# Patient Record
Sex: FEMALE | Race: WHITE | NOT HISPANIC OR LATINO | Employment: OTHER | URBAN - METROPOLITAN AREA
[De-identification: names, ages, dates, MRNs, and addresses within clinical notes are randomized per-mention and may not be internally consistent; named-entity substitution may affect disease eponyms.]

---

## 2022-08-16 ENCOUNTER — OFFICE VISIT (OUTPATIENT)
Dept: RHEUMATOLOGY | Facility: CLINIC | Age: 67
End: 2022-08-16
Payer: MEDICARE

## 2022-08-16 VITALS
OXYGEN SATURATION: 97 % | HEART RATE: 87 BPM | HEIGHT: 62 IN | DIASTOLIC BLOOD PRESSURE: 100 MMHG | SYSTOLIC BLOOD PRESSURE: 160 MMHG | BODY MASS INDEX: 29.59 KG/M2 | TEMPERATURE: 98 F | RESPIRATION RATE: 18 BRPM | WEIGHT: 160.81 LBS

## 2022-08-16 DIAGNOSIS — L40.9 PSORIASIS OF NAIL: ICD-10-CM

## 2022-08-16 DIAGNOSIS — M77.9 ENTHESOPATHY: ICD-10-CM

## 2022-08-16 DIAGNOSIS — M79.7 FIBROMYALGIA SYNDROME: ICD-10-CM

## 2022-08-16 DIAGNOSIS — L40.50 PSORIATIC ARTHRITIS: Primary | ICD-10-CM

## 2022-08-16 PROCEDURE — 99204 OFFICE O/P NEW MOD 45 MIN: CPT | Mod: S$GLB,,, | Performed by: INTERNAL MEDICINE

## 2022-08-16 PROCEDURE — 3008F PR BODY MASS INDEX (BMI) DOCUMENTED: ICD-10-PCS | Mod: CPTII,S$GLB,, | Performed by: INTERNAL MEDICINE

## 2022-08-16 PROCEDURE — 99999 PR PBB SHADOW E&M-NEW PATIENT-LVL V: CPT | Mod: PBBFAC,,, | Performed by: INTERNAL MEDICINE

## 2022-08-16 PROCEDURE — 4010F ACE/ARB THERAPY RXD/TAKEN: CPT | Mod: CPTII,S$GLB,, | Performed by: INTERNAL MEDICINE

## 2022-08-16 PROCEDURE — 3008F BODY MASS INDEX DOCD: CPT | Mod: CPTII,S$GLB,, | Performed by: INTERNAL MEDICINE

## 2022-08-16 PROCEDURE — 1159F MED LIST DOCD IN RCRD: CPT | Mod: CPTII,S$GLB,, | Performed by: INTERNAL MEDICINE

## 2022-08-16 PROCEDURE — 99999 PR PBB SHADOW E&M-NEW PATIENT-LVL V: ICD-10-PCS | Mod: PBBFAC,,, | Performed by: INTERNAL MEDICINE

## 2022-08-16 PROCEDURE — 4010F PR ACE/ARB THEARPY RXD/TAKEN: ICD-10-PCS | Mod: CPTII,S$GLB,, | Performed by: INTERNAL MEDICINE

## 2022-08-16 PROCEDURE — 1160F RVW MEDS BY RX/DR IN RCRD: CPT | Mod: CPTII,S$GLB,, | Performed by: INTERNAL MEDICINE

## 2022-08-16 PROCEDURE — 99204 PR OFFICE/OUTPT VISIT, NEW, LEVL IV, 45-59 MIN: ICD-10-PCS | Mod: S$GLB,,, | Performed by: INTERNAL MEDICINE

## 2022-08-16 PROCEDURE — 1159F PR MEDICATION LIST DOCUMENTED IN MEDICAL RECORD: ICD-10-PCS | Mod: CPTII,S$GLB,, | Performed by: INTERNAL MEDICINE

## 2022-08-16 PROCEDURE — 1160F PR REVIEW ALL MEDS BY PRESCRIBER/CLIN PHARMACIST DOCUMENTED: ICD-10-PCS | Mod: CPTII,S$GLB,, | Performed by: INTERNAL MEDICINE

## 2022-08-16 RX ORDER — FLUTICASONE PROPIONATE 50 MCG
2 SPRAY, SUSPENSION (ML) NASAL
COMMUNITY
Start: 2022-03-07

## 2022-08-16 RX ORDER — FEXOFENADINE HYDROCHLORIDE 180 MG/1
180 TABLET, FILM COATED ORAL DAILY
COMMUNITY
Start: 2022-03-07

## 2022-08-16 RX ORDER — LEFLUNOMIDE 20 MG/1
20 TABLET ORAL
Qty: 30 TABLET | Refills: 11 | Status: SHIPPED | OUTPATIENT
Start: 2022-08-16 | End: 2022-12-13 | Stop reason: SDUPTHER

## 2022-08-16 RX ORDER — PROMETHAZINE HYDROCHLORIDE 25 MG/1
25 TABLET ORAL EVERY 6 HOURS PRN
COMMUNITY
Start: 2022-06-15 | End: 2023-04-18

## 2022-08-16 RX ORDER — TIZANIDINE 2 MG/1
2 TABLET ORAL NIGHTLY
Qty: 30 TABLET | Refills: 5 | Status: SHIPPED | OUTPATIENT
Start: 2022-08-16 | End: 2022-12-13 | Stop reason: SDUPTHER

## 2022-08-16 RX ORDER — CLORAZEPATE DIPOTASSIUM 7.5 MG/1
7.5 TABLET ORAL 3 TIMES DAILY PRN
COMMUNITY
Start: 2022-08-08

## 2022-08-16 RX ORDER — BENAZEPRIL HYDROCHLORIDE 40 MG/1
40 TABLET ORAL 2 TIMES DAILY
COMMUNITY
Start: 2021-11-08

## 2022-08-16 RX ORDER — LOVASTATIN 40 MG/1
40 TABLET ORAL NIGHTLY PRN
COMMUNITY
Start: 2022-07-03

## 2022-08-16 RX ORDER — FAMOTIDINE 40 MG/1
40 TABLET, FILM COATED ORAL EVERY MORNING
COMMUNITY
Start: 2022-08-08

## 2022-08-16 NOTE — PROGRESS NOTES
"Subjective:       Patient ID: Cindy Goncalves is a 66 y.o. female.    Chief Complaint: New patient  (Self Referral..Arthritis..)    Pt  is complaining of joint pain involving her MCP PIP wrist elbow shoulders hips knees and ankles bilaterally.  Is 9/10 in intensity dull in quality and continuous.  It is associated with a morning stiffness lasting for more than 60 minutes. Pt reports having difficulty maintaining a good night of sleep and this has been associated with myalgia of 9/10 in intensity.  This pain is dull continuous and gets worse mainly at night.  It is associated with fatigue.   The patient has psoriasis on an off on her elbows posterior aspect and she has sausage toes bilaterally.    Review of Systems   Constitutional: Negative for appetite change, chills and fever.   HENT: Negative for congestion, ear pain, mouth sores, nosebleeds and trouble swallowing.    Eyes: Negative for photophobia and discharge.   Respiratory: Negative for chest tightness and shortness of breath.    Cardiovascular: Negative for chest pain.   Gastrointestinal: Negative for abdominal pain and vomiting.   Endocrine: Negative.    Genitourinary: Negative for hematuria.   Musculoskeletal:        As per HPI   Skin: Negative for rash.        The patient has psoriasis on an off on her elbows posterior aspect and she has sausage toes bilaterally.     Neurological: Negative for weakness.         Objective:   BP (!) 160/100   Pulse 87   Temp 98.1 °F (36.7 °C) (Oral)   Resp 18   Ht 5' 2" (1.575 m)   Wt 72.9 kg (160 lb 12.8 oz)   SpO2 97%   BMI 29.41 kg/m²      Physical Exam   Constitutional: She is oriented to person, place, and time. She appears well-developed and well-nourished. No distress.   HENT:   Head: Normocephalic and atraumatic.   Right Ear: External ear normal.   Left Ear: External ear normal.   Eyes: Pupils are equal, round, and reactive to light.   Cardiovascular: Normal rate, regular rhythm and normal heart sounds. "   Pulmonary/Chest: Breath sounds normal.   Abdominal: Soft. There is no abdominal tenderness.   Musculoskeletal:      Right shoulder: Tenderness present.      Left shoulder: Tenderness present.      Right elbow: Tenderness present.      Left elbow: Tenderness present.      Right wrist: Tenderness present.      Left wrist: Tenderness present.      Cervical back: Neck supple.      Right hip: Tenderness present.      Left hip: Tenderness present.      Right knee: Tenderness present.      Left knee: Tenderness present.      Right ankle: Tenderness present.      Left ankle: Tenderness present.   Lymphadenopathy:     She has no cervical adenopathy.   Neurological: She is alert and oriented to person, place, and time. She displays normal reflexes. No cranial nerve deficit or sensory deficit. She exhibits normal muscle tone. Coordination normal.   Skin: No rash noted. No erythema.   The patient has psoriasis on an off on her elbows posterior aspect and she has sausage toes bilaterally.     Vitals reviewed.      Right Side Rheumatological Exam     The patient is tender to palpation of the shoulder, elbow, wrist, knee, 1st PIP, 1st MCP, 2nd PIP, 2nd MCP, 3rd PIP, 3rd MCP, 4th PIP, 4th MCP, 5th PIP, hip, ankle, 1st MTP, 2nd MTP, 3rd MTP, 4th MTP, 5th MTP, 1st toe IP, 2nd toe IP, 3rd toe IP, 4th toe IP and 5th toe IP    Left Side Rheumatological Exam     The patient is tender to palpation of the shoulder, elbow, wrist, knee, 1st PIP, 1st MCP, 2nd PIP, 2nd MCP, 3rd PIP, 3rd MCP, 4th PIP, 4th MCP, 5th PIP, 5th MCP, hip, ankle, 1st MTP, 2nd MTP, 3rd MTP, 4th MTP, 5th MTP, 1st toe IP, 2nd toe IP, 3rd toe IP, 4th toe IP and 5th toe IP.         Completed Fibromyalgia exam 18/18 tender points.  No data to display   Sausage toes bilaterally.  Assessment:       1. Psoriatic arthritis    2. Psoriasis of nail    3. Fibromyalgia syndrome    4. Enthesopathy            Plan:       Problem List Items Addressed This Visit    None     Visit  Diagnoses     Psoriatic arthritis    -  Primary    Relevant Medications    leflunomide (ARAVA) 20 MG Tab    tiZANidine (ZANAFLEX) 2 MG tablet    folic acid-vit B6-vit B12 2.5-25-2 mg (FOLBIC OR EQUIV) 2.5-25-2 mg Tab    Psoriasis of nail        Relevant Medications    leflunomide (ARAVA) 20 MG Tab    tiZANidine (ZANAFLEX) 2 MG tablet    folic acid-vit B6-vit B12 2.5-25-2 mg (FOLBIC OR EQUIV) 2.5-25-2 mg Tab    Fibromyalgia syndrome        Relevant Medications    leflunomide (ARAVA) 20 MG Tab    tiZANidine (ZANAFLEX) 2 MG tablet    folic acid-vit B6-vit B12 2.5-25-2 mg (FOLBIC OR EQUIV) 2.5-25-2 mg Tab    Enthesopathy        Relevant Medications    leflunomide (ARAVA) 20 MG Tab    tiZANidine (ZANAFLEX) 2 MG tablet    folic acid-vit B6-vit B12 2.5-25-2 mg (FOLBIC OR EQUIV) 2.5-25-2 mg Tab

## 2022-12-13 ENCOUNTER — OFFICE VISIT (OUTPATIENT)
Dept: RHEUMATOLOGY | Facility: CLINIC | Age: 67
End: 2022-12-13
Payer: MEDICARE

## 2022-12-13 VITALS
BODY MASS INDEX: 28.59 KG/M2 | OXYGEN SATURATION: 96 % | WEIGHT: 155.38 LBS | RESPIRATION RATE: 18 BRPM | SYSTOLIC BLOOD PRESSURE: 173 MMHG | TEMPERATURE: 98 F | HEIGHT: 62 IN | DIASTOLIC BLOOD PRESSURE: 97 MMHG

## 2022-12-13 DIAGNOSIS — L40.9 PSORIASIS OF NAIL: ICD-10-CM

## 2022-12-13 DIAGNOSIS — M77.9 ENTHESOPATHY: ICD-10-CM

## 2022-12-13 DIAGNOSIS — L40.9 PSORIASIS: Primary | ICD-10-CM

## 2022-12-13 DIAGNOSIS — L40.50 PSORIATIC ARTHRITIS: ICD-10-CM

## 2022-12-13 DIAGNOSIS — M79.7 FIBROMYALGIA: ICD-10-CM

## 2022-12-13 PROCEDURE — 3080F DIAST BP >= 90 MM HG: CPT | Mod: CPTII,S$GLB,,

## 2022-12-13 PROCEDURE — 1101F PR PT FALLS ASSESS DOC 0-1 FALLS W/OUT INJ PAST YR: ICD-10-PCS | Mod: CPTII,S$GLB,,

## 2022-12-13 PROCEDURE — 1159F MED LIST DOCD IN RCRD: CPT | Mod: CPTII,S$GLB,,

## 2022-12-13 PROCEDURE — 3080F PR MOST RECENT DIASTOLIC BLOOD PRESSURE >= 90 MM HG: ICD-10-PCS | Mod: CPTII,S$GLB,,

## 2022-12-13 PROCEDURE — 4010F PR ACE/ARB THEARPY RXD/TAKEN: ICD-10-PCS | Mod: CPTII,S$GLB,,

## 2022-12-13 PROCEDURE — 3008F PR BODY MASS INDEX (BMI) DOCUMENTED: ICD-10-PCS | Mod: CPTII,S$GLB,,

## 2022-12-13 PROCEDURE — 99999 PR PBB SHADOW E&M-EST. PATIENT-LVL IV: ICD-10-PCS | Mod: PBBFAC,,,

## 2022-12-13 PROCEDURE — 99213 PR OFFICE/OUTPT VISIT, EST, LEVL III, 20-29 MIN: ICD-10-PCS | Mod: S$GLB,,,

## 2022-12-13 PROCEDURE — 1101F PT FALLS ASSESS-DOCD LE1/YR: CPT | Mod: CPTII,S$GLB,,

## 2022-12-13 PROCEDURE — 99999 PR PBB SHADOW E&M-EST. PATIENT-LVL IV: CPT | Mod: PBBFAC,,,

## 2022-12-13 PROCEDURE — 4010F ACE/ARB THERAPY RXD/TAKEN: CPT | Mod: CPTII,S$GLB,,

## 2022-12-13 PROCEDURE — 3077F SYST BP >= 140 MM HG: CPT | Mod: CPTII,S$GLB,,

## 2022-12-13 PROCEDURE — 3288F PR FALLS RISK ASSESSMENT DOCUMENTED: ICD-10-PCS | Mod: CPTII,S$GLB,,

## 2022-12-13 PROCEDURE — 1159F PR MEDICATION LIST DOCUMENTED IN MEDICAL RECORD: ICD-10-PCS | Mod: CPTII,S$GLB,,

## 2022-12-13 PROCEDURE — 99213 OFFICE O/P EST LOW 20 MIN: CPT | Mod: S$GLB,,,

## 2022-12-13 PROCEDURE — 3077F PR MOST RECENT SYSTOLIC BLOOD PRESSURE >= 140 MM HG: ICD-10-PCS | Mod: CPTII,S$GLB,,

## 2022-12-13 PROCEDURE — 3288F FALL RISK ASSESSMENT DOCD: CPT | Mod: CPTII,S$GLB,,

## 2022-12-13 PROCEDURE — 3008F BODY MASS INDEX DOCD: CPT | Mod: CPTII,S$GLB,,

## 2022-12-13 RX ORDER — TRIAMCINOLONE ACETONIDE 1 MG/G
OINTMENT TOPICAL 2 TIMES DAILY
Qty: 30 G | Refills: 2 | Status: SHIPPED | OUTPATIENT
Start: 2022-12-13 | End: 2023-04-18

## 2022-12-13 RX ORDER — LEFLUNOMIDE 20 MG/1
20 TABLET ORAL
Qty: 30 TABLET | Refills: 5 | Status: SHIPPED | OUTPATIENT
Start: 2022-12-13 | End: 2023-04-18 | Stop reason: SDUPTHER

## 2022-12-13 RX ORDER — TIZANIDINE 2 MG/1
2 TABLET ORAL NIGHTLY
Qty: 30 TABLET | Refills: 5 | Status: SHIPPED | OUTPATIENT
Start: 2022-12-13 | End: 2023-04-18 | Stop reason: SDUPTHER

## 2022-12-13 NOTE — PROGRESS NOTES
"Subjective:           Patient ID: Cindy Goncalves is a 67 y.o. female.    Chief Complaint: Follow-up      Ms. Goncalves is a 67-year-old here for a follow-up.  She initiated care with Dr. Padilla on 8/16/22 and was initiated on leflunomide. Patient does report history of dental cleaning and dental fillings resulting in infection. Decide appropriate for prophylactic antibiotics before next dental work. The patient has psoriasis on her elbows posterior aspect and she has sausage toes bilaterally. Reports having recent labwork. Will request labwork from Dr. Dorina [facility].  Today she reports significant improvement since starting the leflunomide. The patient reports her joint pain involving the MCP PIP wrist elbow shoulders hips knees and ankles bilaterally.  The pain is 1/10 in intensity dull in quality and continuous.  That is associated with a morning stiffness lasting for less than 5 minutes. They are associated with fatigue.  No fever no chills no others.        Review of Systems   Constitutional:  Negative for appetite change, chills and fever.   HENT:  Negative for congestion, ear pain, mouth sores, nosebleeds and trouble swallowing.    Eyes:  Negative for photophobia and discharge.   Respiratory:  Negative for chest tightness and shortness of breath.    Cardiovascular:  Negative for chest pain.   Gastrointestinal:  Negative for abdominal pain and vomiting.   Endocrine: Negative.    Genitourinary:  Negative for hematuria.   Musculoskeletal:         As per HPI   Skin:  Negative for rash.   Neurological:  Negative for weakness.       Objective:   BP (!) 173/97 (BP Location: Left arm, Patient Position: Sitting, BP Method: Medium (Automatic))   Temp 97.9 °F (36.6 °C) (Oral)   Resp 18   Ht 5' 2" (1.575 m)   Wt 70.5 kg (155 lb 6.4 oz)   SpO2 96%   BMI 28.42 kg/m²          Physical Exam   Constitutional: She is oriented to person, place, and time. She appears well-developed and " well-nourished. No distress.   HENT:   Head: Normocephalic and atraumatic.   Right Ear: External ear normal.   Left Ear: External ear normal.   Eyes: Pupils are equal, round, and reactive to light.   Cardiovascular: Normal rate, regular rhythm and normal heart sounds.   Pulmonary/Chest: Breath sounds normal.   Abdominal: Soft. There is no abdominal tenderness.   Musculoskeletal:      Cervical back: Neck supple.      Comments: Sausage toes   Lymphadenopathy:     She has no cervical adenopathy.   Neurological: She is alert and oriented to person, place, and time. She displays normal reflexes. No cranial nerve deficit or sensory deficit. She exhibits normal muscle tone. Coordination normal.   Skin: No rash noted. No erythema.   Vitals reviewed.     Completed Fibromyalgia exam 18/18 tender points.    No data to display     Assessment:         Medication List with Changes/Refills   New Medications    TRIAMCINOLONE ACETONIDE 0.1% (KENALOG) 0.1 % OINTMENT    Apply topically 2 (two) times daily. To affected areas       Start Date: 12/13/2022End Date: --   Current Medications    ALLERGY RELIEF, FEXOFENADINE, 180 MG TABLET    Take 180 mg by mouth once daily.       Start Date: 3/7/2022  End Date: --    BENAZEPRIL (LOTENSIN) 40 MG TABLET    Take 40 mg by mouth 2 (two) times a day.       Start Date: 11/8/2021 End Date: --    CLORAZEPATE (TRANXENE) 7.5 MG TAB    Take 7.5 mg by mouth 3 (three) times daily as needed.       Start Date: 8/8/2022  End Date: --    FAMOTIDINE (PEPCID) 40 MG TABLET    Take 40 mg by mouth every morning.       Start Date: 8/8/2022  End Date: --    FLUTICASONE PROPIONATE (FLONASE) 50 MCG/ACTUATION NASAL SPRAY    2 sprays by Each Nostril route as needed.       Start Date: 3/7/2022  End Date: --    LOVASTATIN (MEVACOR) 40 MG TABLET    Take 40 mg by mouth nightly as needed.       Start Date: 7/3/2022  End Date: --    PROMETHAZINE (PHENERGAN) 25 MG TABLET    Take 25 mg by mouth every 6 (six) hours as needed.        Start Date: 6/15/2022 End Date: --   Changed and/or Refilled Medications    Modified Medication Previous Medication    LEFLUNOMIDE (ARAVA) 20 MG TAB leflunomide (ARAVA) 20 MG Tab       Take 1 tablet (20 mg total) by mouth daily with dinner or evening meal.    Take 1 tablet (20 mg total) by mouth daily with dinner or evening meal.       Start Date: 12/13/2022End Date: --    Start Date: 8/16/2022 End Date: 12/13/2022    TIZANIDINE (ZANAFLEX) 2 MG TABLET tiZANidine (ZANAFLEX) 2 MG tablet       Take 1 tablet (2 mg total) by mouth nightly.    Take 1 tablet (2 mg total) by mouth nightly.       Start Date: 12/13/2022End Date: 6/11/2023    Start Date: 8/16/2022 End Date: 12/13/2022   Discontinued Medications    FOLIC ACID-VIT B6-VIT B12 2.5-25-2 MG (FOLBIC OR EQUIV) 2.5-25-2 MG TAB    Take 1 tablet by mouth once daily. After breakfast       Start Date: 8/16/2022 End Date: 12/13/2022         ICD-10-CM ICD-9-CM   1. Psoriasis  L40.9 696.1   2. Psoriatic arthritis  L40.50 696.0   3. Enthesopathy  M77.9 726.90   4. Fibromyalgia  M79.7 729.1   5. Psoriasis of nail  L40.9 696.1             Plan:       1. Psoriasis  Assessment & Plan:  Start Triamcinolone Cream    Orders:  -     triamcinolone acetonide 0.1% (KENALOG) 0.1 % ointment; Apply topically 2 (two) times daily. To affected areas  Dispense: 30 g; Refill: 2    2. Psoriatic arthritis  Assessment & Plan:  Continue leflunomide 20 mg daily    Orders:  -     leflunomide (ARAVA) 20 MG Tab; Take 1 tablet (20 mg total) by mouth daily with dinner or evening meal.  Dispense: 30 tablet; Refill: 5  -     tiZANidine (ZANAFLEX) 2 MG tablet; Take 1 tablet (2 mg total) by mouth nightly.  Dispense: 30 tablet; Refill: 5    3. Enthesopathy  -     leflunomide (ARAVA) 20 MG Tab; Take 1 tablet (20 mg total) by mouth daily with dinner or evening meal.  Dispense: 30 tablet; Refill: 5  -     tiZANidine (ZANAFLEX) 2 MG tablet; Take 1 tablet (2 mg total) by mouth nightly.  Dispense: 30 tablet;  Refill: 5    4. Fibromyalgia  Assessment & Plan:  Continue tizanidine 2 mg daily at bedtime  Continue Folbic daily      5. Psoriasis of nail

## 2023-04-17 NOTE — ASSESSMENT & PLAN NOTE
Continue leflunomide 20 mg daily    [facility]

## 2023-04-17 NOTE — PROGRESS NOTES
Subjective:           Patient ID: Cindy Goncalves is a 67 y.o. female.    Chief Complaint: Follow-up      Ms. Goncalves is a 67-year-old here for a follow-up.  She initiated care with Dr. Padilla on 8/16/22 and was initiated on leflunomide. Patient does report history of dental cleaning and dental fillings resulting in infection. Discussed appropriate for prophylactic antibiotics before next dental work. She's had 2 past infections with dental cleaning and requests her dentis start the prophylactic antibiotics before the cleaning. The patient has psoriasis on her elbows posterior aspect and she has sausage toes bilaterally. She sees her PCP next month and is having lab work this month and patient will request them to send us those lab results from Dr. Dorina Rosas Internal [facility].  She does have a skin spot that is dark with atypical borders and also hx of atypical mole- requests referral to dermatology today.Today she reports significant improvement since starting the leflunomide. The patient reports joint pain involving the MCP PIP wrist elbow shoulders hips knees and ankles bilaterally.  The pain is 1/10 in intensity dull in quality and continuous.  That is associated with a morning stiffness lasting for less than 5 minutes. Reports her quality of life has significantly improved and she is now able to do activities she once enjoyed such as gardening.  Denies fever and chills.        Review of Systems   Constitutional:  Negative for appetite change, chills and fever.   HENT:  Negative for congestion, ear pain, mouth sores, nosebleeds and trouble swallowing.    Eyes:  Negative for photophobia and discharge.   Respiratory:  Negative for chest tightness and shortness of breath.    Cardiovascular:  Negative for chest pain.   Gastrointestinal:  Negative for abdominal pain and vomiting.   Endocrine: Negative.    Genitourinary:  Negative for hematuria.   Musculoskeletal:         As per HPI   Skin:  Negative for  "rash.        psoiasis   Neurological:  Negative for weakness.       Objective:   BP (!) 160/90 (BP Location: Left arm, Patient Position: Sitting, BP Method: Medium (Manual))   Pulse 90   Temp 98.1 °F (36.7 °C) (Oral)   Resp 18   Ht 5' 2" (1.575 m)   Wt 70.8 kg (156 lb)   SpO2 (!) 94%   BMI 28.53 kg/m²          Physical Exam   Constitutional: She is oriented to person, place, and time. She appears well-developed and well-nourished. No distress.   HENT:   Head: Normocephalic and atraumatic.   Right Ear: External ear normal.   Left Ear: External ear normal.   Eyes: Pupils are equal, round, and reactive to light.   Cardiovascular: Normal rate.   Pulmonary/Chest: Effort normal.   Abdominal: Soft. There is no abdominal tenderness.   Musculoskeletal:      Cervical back: Neck supple.      Comments: Sausage toes   Lymphadenopathy:     She has no cervical adenopathy.   Neurological: She is alert and oriented to person, place, and time. She displays normal reflexes. No cranial nerve deficit or sensory deficit. She exhibits normal muscle tone. Coordination normal.   Skin: No rash noted. No erythema.   Vitals reviewed.     Completed Fibromyalgia exam 18/18 tender points.    No data to display     Assessment:         Medication List with Changes/Refills   New Medications    PREDNISONE (DELTASONE) 5 MG TABLET    Take 1 tablet (5 mg total) by mouth daily as needed (for flare up). AFTER BREAKFAST       Start Date: 4/18/2023 End Date: --   Current Medications    ALLERGY RELIEF, FEXOFENADINE, 180 MG TABLET    Take 180 mg by mouth once daily.       Start Date: 3/7/2022  End Date: --    BENAZEPRIL (LOTENSIN) 40 MG TABLET    Take 40 mg by mouth 2 (two) times a day.       Start Date: 11/8/2021 End Date: --    CLORAZEPATE (TRANXENE) 7.5 MG TAB    Take 7.5 mg by mouth 3 (three) times daily as needed.       Start Date: 8/8/2022  End Date: --    FAMOTIDINE (PEPCID) 40 MG TABLET    Take 40 mg by mouth every morning.       Start Date: " 8/8/2022  End Date: --    FLUTICASONE PROPIONATE (FLONASE) 50 MCG/ACTUATION NASAL SPRAY    2 sprays by Each Nostril route as needed.       Start Date: 3/7/2022  End Date: --    LOVASTATIN (MEVACOR) 40 MG TABLET    Take 40 mg by mouth nightly as needed.       Start Date: 7/3/2022  End Date: --   Changed and/or Refilled Medications    Modified Medication Previous Medication    LEFLUNOMIDE (ARAVA) 20 MG TAB leflunomide (ARAVA) 20 MG Tab       Take 1 tablet (20 mg total) by mouth daily with dinner or evening meal.    Take 1 tablet (20 mg total) by mouth daily with dinner or evening meal.       Start Date: 4/18/2023 End Date: --    Start Date: 12/13/2022End Date: 4/18/2023    TIZANIDINE (ZANAFLEX) 2 MG TABLET tiZANidine (ZANAFLEX) 2 MG tablet       Take 1 tablet (2 mg total) by mouth nightly.    Take 1 tablet (2 mg total) by mouth nightly.       Start Date: 4/18/2023 End Date: 10/15/2023    Start Date: 12/13/2022End Date: 4/18/2023   Discontinued Medications    PROMETHAZINE (PHENERGAN) 25 MG TABLET    Take 25 mg by mouth every 6 (six) hours as needed.       Start Date: 6/15/2022 End Date: 4/18/2023    TRIAMCINOLONE ACETONIDE 0.1% (KENALOG) 0.1 % OINTMENT    Apply topically 2 (two) times daily. To affected areas       Start Date: 12/13/2022End Date: 4/18/2023         ICD-10-CM ICD-9-CM   1. Psoriatic arthritis  L40.50 696.0   2. Psoriasis  L40.9 696.1   3. Fibromyalgia  M79.7 729.1   4. Enthesopathy  M77.9 726.90   5. History of atypical skin mole  Z86.018 V13.3             Plan:       1. Psoriatic arthritis  Assessment & Plan:  Continue leflunomide 20 mg daily    Need to request completed  labwork from Dr. Dorina [facility].     Orders:  -     leflunomide (ARAVA) 20 MG Tab; Take 1 tablet (20 mg total) by mouth daily with dinner or evening meal.  Dispense: 30 tablet; Refill: 5  -     tiZANidine (ZANAFLEX) 2 MG tablet; Take 1 tablet (2 mg total) by mouth nightly.  Dispense: 30 tablet; Refill:  5    2. Psoriasis  Assessment & Plan:  Currently stable    Orders:  -     Ambulatory referral/consult to Dermatology; Future; Expected date: 04/25/2023    3. Fibromyalgia  Assessment & Plan:  Continue tizanidine 2 mg daily at bedtime  Continue Folbic daily      4. Enthesopathy  -     leflunomide (ARAVA) 20 MG Tab; Take 1 tablet (20 mg total) by mouth daily with dinner or evening meal.  Dispense: 30 tablet; Refill: 5    5. History of atypical skin mole  Assessment & Plan:  Referral to Dermatology- Dr. Dwain Vazquez    Orders:  -     Ambulatory referral/consult to Dermatology; Future; Expected date: 04/25/2023    Other orders  -     predniSONE (DELTASONE) 5 MG tablet; Take 1 tablet (5 mg total) by mouth daily as needed (for flare up). AFTER BREAKFAST  Dispense: 30 tablet; Refill: 0

## 2023-04-18 ENCOUNTER — OFFICE VISIT (OUTPATIENT)
Dept: RHEUMATOLOGY | Facility: CLINIC | Age: 68
End: 2023-04-18
Payer: MEDICARE

## 2023-04-18 VITALS
WEIGHT: 156 LBS | RESPIRATION RATE: 18 BRPM | DIASTOLIC BLOOD PRESSURE: 90 MMHG | SYSTOLIC BLOOD PRESSURE: 160 MMHG | TEMPERATURE: 98 F | OXYGEN SATURATION: 94 % | BODY MASS INDEX: 28.71 KG/M2 | HEART RATE: 90 BPM | HEIGHT: 62 IN

## 2023-04-18 DIAGNOSIS — L40.50 PSORIATIC ARTHRITIS: Primary | ICD-10-CM

## 2023-04-18 DIAGNOSIS — L40.9 PSORIASIS: ICD-10-CM

## 2023-04-18 DIAGNOSIS — M77.9 ENTHESOPATHY: ICD-10-CM

## 2023-04-18 DIAGNOSIS — M79.7 FIBROMYALGIA: ICD-10-CM

## 2023-04-18 DIAGNOSIS — Z86.018 HISTORY OF ATYPICAL SKIN MOLE: ICD-10-CM

## 2023-04-18 PROCEDURE — 3288F FALL RISK ASSESSMENT DOCD: CPT | Mod: CPTII,S$GLB,,

## 2023-04-18 PROCEDURE — 1101F PR PT FALLS ASSESS DOC 0-1 FALLS W/OUT INJ PAST YR: ICD-10-PCS | Mod: CPTII,S$GLB,,

## 2023-04-18 PROCEDURE — 3080F PR MOST RECENT DIASTOLIC BLOOD PRESSURE >= 90 MM HG: ICD-10-PCS | Mod: CPTII,S$GLB,,

## 2023-04-18 PROCEDURE — 3077F PR MOST RECENT SYSTOLIC BLOOD PRESSURE >= 140 MM HG: ICD-10-PCS | Mod: CPTII,S$GLB,,

## 2023-04-18 PROCEDURE — 3080F DIAST BP >= 90 MM HG: CPT | Mod: CPTII,S$GLB,,

## 2023-04-18 PROCEDURE — 1159F MED LIST DOCD IN RCRD: CPT | Mod: CPTII,S$GLB,,

## 2023-04-18 PROCEDURE — 99214 PR OFFICE/OUTPT VISIT, EST, LEVL IV, 30-39 MIN: ICD-10-PCS | Mod: S$GLB,,,

## 2023-04-18 PROCEDURE — 4010F ACE/ARB THERAPY RXD/TAKEN: CPT | Mod: CPTII,S$GLB,,

## 2023-04-18 PROCEDURE — 3008F BODY MASS INDEX DOCD: CPT | Mod: CPTII,S$GLB,,

## 2023-04-18 PROCEDURE — 99999 PR PBB SHADOW E&M-EST. PATIENT-LVL IV: ICD-10-PCS | Mod: PBBFAC,,,

## 2023-04-18 PROCEDURE — 99214 OFFICE O/P EST MOD 30 MIN: CPT | Mod: S$GLB,,,

## 2023-04-18 PROCEDURE — 1101F PT FALLS ASSESS-DOCD LE1/YR: CPT | Mod: CPTII,S$GLB,,

## 2023-04-18 PROCEDURE — 1126F AMNT PAIN NOTED NONE PRSNT: CPT | Mod: CPTII,S$GLB,,

## 2023-04-18 PROCEDURE — 1126F PR PAIN SEVERITY QUANTIFIED, NO PAIN PRESENT: ICD-10-PCS | Mod: CPTII,S$GLB,,

## 2023-04-18 PROCEDURE — 99999 PR PBB SHADOW E&M-EST. PATIENT-LVL IV: CPT | Mod: PBBFAC,,,

## 2023-04-18 PROCEDURE — 3077F SYST BP >= 140 MM HG: CPT | Mod: CPTII,S$GLB,,

## 2023-04-18 PROCEDURE — 3288F PR FALLS RISK ASSESSMENT DOCUMENTED: ICD-10-PCS | Mod: CPTII,S$GLB,,

## 2023-04-18 PROCEDURE — 4010F PR ACE/ARB THEARPY RXD/TAKEN: ICD-10-PCS | Mod: CPTII,S$GLB,,

## 2023-04-18 PROCEDURE — 3008F PR BODY MASS INDEX (BMI) DOCUMENTED: ICD-10-PCS | Mod: CPTII,S$GLB,,

## 2023-04-18 PROCEDURE — 1159F PR MEDICATION LIST DOCUMENTED IN MEDICAL RECORD: ICD-10-PCS | Mod: CPTII,S$GLB,,

## 2023-04-18 RX ORDER — PREDNISONE 5 MG/1
5 TABLET ORAL DAILY PRN
Qty: 30 TABLET | Refills: 0 | Status: SHIPPED | OUTPATIENT
Start: 2023-04-18 | End: 2024-01-09

## 2023-04-18 RX ORDER — LEFLUNOMIDE 20 MG/1
20 TABLET ORAL
Qty: 30 TABLET | Refills: 5 | Status: SHIPPED | OUTPATIENT
Start: 2023-04-18 | End: 2023-08-07 | Stop reason: SDUPTHER

## 2023-04-18 RX ORDER — TIZANIDINE 2 MG/1
2 TABLET ORAL NIGHTLY
Qty: 30 TABLET | Refills: 5 | Status: SHIPPED | OUTPATIENT
Start: 2023-04-18 | End: 2023-08-07 | Stop reason: SDUPTHER

## 2023-06-22 ENCOUNTER — TELEPHONE (OUTPATIENT)
Dept: RHEUMATOLOGY | Facility: CLINIC | Age: 68
End: 2023-06-22
Payer: MEDICARE

## 2023-06-22 NOTE — TELEPHONE ENCOUNTER
Pt is having a routine dental cleaning tomorrow and her dentist was going to send her in some antibiotics but, she wanted us to send it. Advised pt that her dentist could send it in for her and that would be okay. Pt verbally understands and will call her dentist.

## 2023-06-22 NOTE — TELEPHONE ENCOUNTER
----- Message from María Peters sent at 6/22/2023  9:17 AM CDT -----  Regarding: Nurse Call Back  Patient called stating she needs to have dental work Friday and would like to speak to someone in reference to medications before procedure. [phone]

## 2023-08-07 ENCOUNTER — OFFICE VISIT (OUTPATIENT)
Dept: RHEUMATOLOGY | Facility: CLINIC | Age: 68
End: 2023-08-07
Payer: MEDICARE

## 2023-08-07 ENCOUNTER — TELEPHONE (OUTPATIENT)
Dept: RHEUMATOLOGY | Facility: CLINIC | Age: 68
End: 2023-08-07

## 2023-08-07 VITALS
SYSTOLIC BLOOD PRESSURE: 165 MMHG | HEIGHT: 62 IN | HEART RATE: 80 BPM | OXYGEN SATURATION: 98 % | DIASTOLIC BLOOD PRESSURE: 90 MMHG | TEMPERATURE: 98 F | RESPIRATION RATE: 18 BRPM | BODY MASS INDEX: 29.01 KG/M2 | WEIGHT: 157.63 LBS

## 2023-08-07 DIAGNOSIS — Z86.018 HISTORY OF ATYPICAL SKIN MOLE: ICD-10-CM

## 2023-08-07 DIAGNOSIS — L40.9 PSORIASIS: ICD-10-CM

## 2023-08-07 DIAGNOSIS — L40.50 PSORIATIC ARTHRITIS: Primary | ICD-10-CM

## 2023-08-07 DIAGNOSIS — M77.9 ENTHESOPATHY: ICD-10-CM

## 2023-08-07 DIAGNOSIS — M79.7 FIBROMYALGIA: ICD-10-CM

## 2023-08-07 PROCEDURE — 3080F DIAST BP >= 90 MM HG: CPT | Mod: CPTII,S$GLB,,

## 2023-08-07 PROCEDURE — 3080F PR MOST RECENT DIASTOLIC BLOOD PRESSURE >= 90 MM HG: ICD-10-PCS | Mod: CPTII,S$GLB,,

## 2023-08-07 PROCEDURE — 3077F SYST BP >= 140 MM HG: CPT | Mod: CPTII,S$GLB,,

## 2023-08-07 PROCEDURE — 3077F PR MOST RECENT SYSTOLIC BLOOD PRESSURE >= 140 MM HG: ICD-10-PCS | Mod: CPTII,S$GLB,,

## 2023-08-07 PROCEDURE — 3008F BODY MASS INDEX DOCD: CPT | Mod: CPTII,S$GLB,,

## 2023-08-07 PROCEDURE — 1126F AMNT PAIN NOTED NONE PRSNT: CPT | Mod: CPTII,S$GLB,,

## 2023-08-07 PROCEDURE — 1159F MED LIST DOCD IN RCRD: CPT | Mod: CPTII,S$GLB,,

## 2023-08-07 PROCEDURE — 99999 PR PBB SHADOW E&M-EST. PATIENT-LVL V: CPT | Mod: PBBFAC,,,

## 2023-08-07 PROCEDURE — 1159F PR MEDICATION LIST DOCUMENTED IN MEDICAL RECORD: ICD-10-PCS | Mod: CPTII,S$GLB,,

## 2023-08-07 PROCEDURE — 3008F PR BODY MASS INDEX (BMI) DOCUMENTED: ICD-10-PCS | Mod: CPTII,S$GLB,,

## 2023-08-07 PROCEDURE — 4010F ACE/ARB THERAPY RXD/TAKEN: CPT | Mod: CPTII,S$GLB,,

## 2023-08-07 PROCEDURE — 1126F PR PAIN SEVERITY QUANTIFIED, NO PAIN PRESENT: ICD-10-PCS | Mod: CPTII,S$GLB,,

## 2023-08-07 PROCEDURE — 99214 PR OFFICE/OUTPT VISIT, EST, LEVL IV, 30-39 MIN: ICD-10-PCS | Mod: S$GLB,,,

## 2023-08-07 PROCEDURE — 1101F PR PT FALLS ASSESS DOC 0-1 FALLS W/OUT INJ PAST YR: ICD-10-PCS | Mod: CPTII,S$GLB,,

## 2023-08-07 PROCEDURE — 4010F PR ACE/ARB THEARPY RXD/TAKEN: ICD-10-PCS | Mod: CPTII,S$GLB,,

## 2023-08-07 PROCEDURE — 3288F PR FALLS RISK ASSESSMENT DOCUMENTED: ICD-10-PCS | Mod: CPTII,S$GLB,,

## 2023-08-07 PROCEDURE — 3288F FALL RISK ASSESSMENT DOCD: CPT | Mod: CPTII,S$GLB,,

## 2023-08-07 PROCEDURE — 99999 PR PBB SHADOW E&M-EST. PATIENT-LVL V: ICD-10-PCS | Mod: PBBFAC,,,

## 2023-08-07 PROCEDURE — 99214 OFFICE O/P EST MOD 30 MIN: CPT | Mod: S$GLB,,,

## 2023-08-07 PROCEDURE — 1101F PT FALLS ASSESS-DOCD LE1/YR: CPT | Mod: CPTII,S$GLB,,

## 2023-08-07 RX ORDER — TIZANIDINE 2 MG/1
2 TABLET ORAL NIGHTLY
Qty: 30 TABLET | Refills: 5 | Status: SHIPPED | OUTPATIENT
Start: 2023-08-07 | End: 2024-02-03

## 2023-08-07 RX ORDER — LEFLUNOMIDE 20 MG/1
20 TABLET ORAL
Qty: 30 TABLET | Refills: 5 | Status: SHIPPED | OUTPATIENT
Start: 2023-08-07 | End: 2024-01-09 | Stop reason: SDUPTHER

## 2023-08-07 NOTE — PROGRESS NOTES
"Subjective:           Patient ID: Cindy Goncalves is a 67 y.o. female.    Chief Complaint: Follow-up (Issues with skin . Hands cracking in different areas. Fingers locking on left hand . )      Ms. Goncalves is a 67-year-old here for a follow-up.  She initiated care with Dr. Padilla on 8/16/22 and was initiated on leflunomide. Patient does report history of dental cleaning and dental fillings resulting in infection. Discussed appropriate for prophylactic antibiotics before next dental work. She's had 2 past infections with dental cleaning and requests her dentis start the prophylactic antibiotics before the cleaning. The patient has psoriasis on her elbows posterior aspect in the past but not currently  and she has sausage toes bilaterally.     At her last office visit in March 2023 she was having lab work with her PCP Dr. Dorina Rosas Internal medicine and was going to have them send us those labs. As requested she was also referred to Dermatology for  skin spot that is dark with atypical borders and also hx of atypical mole. She reports her insurance did not cover the referral to Dr. Vazquez's office and requesting referral to [facility] per insurance.  There were no medication changes at her last office visit    Today she reports she is doing well and this is the "best she's felt in years" She is able to garden and be active with little pain. The patient reports joint pain involving the MCP PIP wrist elbow shoulders hips knees and ankles bilaterally.  The pain is 1/10 in intensity dull in quality and continuous.  That is associated with a morning stiffness lasting for less than 5 minutes. Reports her quality of life has significantly improved and she is now able to do activities she once enjoyed such as gardening.  Denies fever and chills.        Review of Systems   Constitutional:  Negative for appetite change, chills and fever.   HENT:  Negative for congestion, ear pain, mouth sores, nosebleeds and " "trouble swallowing.    Eyes:  Negative for photophobia and discharge.   Respiratory:  Negative for chest tightness and shortness of breath.    Cardiovascular:  Negative for chest pain.   Gastrointestinal:  Negative for abdominal pain and vomiting.   Endocrine: Negative.    Genitourinary:  Negative for hematuria.   Musculoskeletal:         As per HPI   Skin:  Negative for rash.        Psoriasis   Cracking on right hand between first and second digits   Neurological:  Negative for weakness.         Objective:   BP (!) 165/90 (BP Location: Right arm, Patient Position: Sitting, BP Method: Medium (Manual))   Pulse 80   Temp 98.1 °F (36.7 °C) (Oral)   Resp 18   Ht 5' 2" (1.575 m)   Wt 71.5 kg (157 lb 9.6 oz)   SpO2 98%   BMI 28.83 kg/m²          Physical Exam   Constitutional: She is oriented to person, place, and time. She appears well-developed and well-nourished. No distress.   HENT:   Head: Normocephalic and atraumatic.   Right Ear: External ear normal.   Left Ear: External ear normal.   Eyes: Pupils are equal, round, and reactive to light.   Cardiovascular: Normal rate.   Pulmonary/Chest: Effort normal.   Abdominal: Soft. There is no abdominal tenderness.   Musculoskeletal:      Cervical back: Neck supple.      Comments: Sausage toes   Lymphadenopathy:     She has no cervical adenopathy.   Neurological: She is alert and oriented to person, place, and time. She displays normal reflexes. No cranial nerve deficit or sensory deficit. She exhibits normal muscle tone. Coordination normal.   Skin: No rash noted. No erythema.   Vitals reviewed.       Completed Fibromyalgia exam 18/18 tender points.    No data to display     Assessment:         Medication List with Changes/Refills   Current Medications    ALLERGY RELIEF, FEXOFENADINE, 180 MG TABLET    Take 180 mg by mouth once daily.       Start Date: 3/7/2022  End Date: --    BENAZEPRIL (LOTENSIN) 40 MG TABLET    Take 40 mg by mouth 2 (two) times a day.       Start " Date: 11/8/2021 End Date: --    CLORAZEPATE (TRANXENE) 7.5 MG TAB    Take 7.5 mg by mouth 3 (three) times daily as needed.       Start Date: 8/8/2022  End Date: --    FAMOTIDINE (PEPCID) 40 MG TABLET    Take 40 mg by mouth every morning.       Start Date: 8/8/2022  End Date: --    FLUTICASONE PROPIONATE (FLONASE) 50 MCG/ACTUATION NASAL SPRAY    2 sprays by Each Nostril route as needed.       Start Date: 3/7/2022  End Date: --    LOVASTATIN (MEVACOR) 40 MG TABLET    Take 40 mg by mouth nightly as needed.       Start Date: 7/3/2022  End Date: --    PREDNISONE (DELTASONE) 5 MG TABLET    Take 1 tablet (5 mg total) by mouth daily as needed (for flare up). AFTER BREAKFAST       Start Date: 4/18/2023 End Date: --   Changed and/or Refilled Medications    Modified Medication Previous Medication    LEFLUNOMIDE (ARAVA) 20 MG TAB leflunomide (ARAVA) 20 MG Tab       Take 1 tablet (20 mg total) by mouth daily with dinner or evening meal.    Take 1 tablet (20 mg total) by mouth daily with dinner or evening meal.       Start Date: 8/7/2023  End Date: --    Start Date: 4/18/2023 End Date: 8/7/2023    TIZANIDINE (ZANAFLEX) 2 MG TABLET tiZANidine (ZANAFLEX) 2 MG tablet       Take 1 tablet (2 mg total) by mouth nightly.    Take 1 tablet (2 mg total) by mouth nightly.       Start Date: 8/7/2023  End Date: 2/3/2024    Start Date: 4/18/2023 End Date: 8/7/2023         ICD-10-CM ICD-9-CM   1. Psoriatic arthritis  L40.50 696.0   2. Fibromyalgia  M79.7 729.1   3. Psoriasis  L40.9 696.1   4. History of atypical skin mole  Z86.018 V13.3   5. Enthesopathy  M77.9 726.90             Plan:       1. Psoriatic arthritis  Assessment & Plan:  Continue leflunomide 20 mg daily  Continue prednisone 5 mg daily PRN for flare up. She has not needed to use this .     [facility]     Orders:  -     tiZANidine (ZANAFLEX) 2 MG tablet; Take 1 tablet (2 mg total) by mouth nightly.   Dispense: 30 tablet; Refill: 5  -     leflunomide (ARAVA) 20 MG Tab; Take 1 tablet (20 mg total) by mouth daily with dinner or evening meal.  Dispense: 30 tablet; Refill: 5  -     Ambulatory referral/consult to Dermatology; Future; Expected date: 08/14/2023    2. Fibromyalgia  Assessment & Plan:  Continue tizanidine 2 mg daily at bedtime        3. Psoriasis  Assessment & Plan:  Currently stable  Referred to Dermatology, Dr Dwain Vazquez at last office visit but was not covered by her insurance. She is requesting  Referral to [facility], which is covered by her insurance.    Currently using triamcinolone with improvement    Past psoriasis on  bilateral elbows and knees    Orders:  -     Ambulatory referral/consult to Dermatology; Future; Expected date: 08/14/2023    4. History of atypical skin mole  Assessment & Plan:  Referral to Dermatology- [facility]    Orders:  -     Ambulatory referral/consult to Dermatology; Future; Expected date: 08/14/2023    5. Enthesopathy  -     leflunomide (ARAVA) 20 MG Tab; Take 1 tablet (20 mg total) by mouth daily with dinner or evening meal.  Dispense: 30 tablet; Refill: 5             Refer to Dermatology  31 minutes of total time spent on the encounter, which includes face to face time and non-face to face time preparing to see the patient (eg, review of tests), Obtaining and/or reviewing separately obtained history, Documenting clinical information in the electronic or other health record, Independently interpreting results (not separately reported) and communicating results to the patient/family/caregiver, or Care coordination (not separately reported).

## 2023-08-07 NOTE — ASSESSMENT & PLAN NOTE
Currently stable  Referred to Dermatology, Dr Dwain Vazquez at last office visit but was not covered by her insurance. She is requesting  Referral to [facility], which is covered by her insurance.    Currently using triamcinolone with improvement    Past psoriasis on  bilateral elbows and knees

## 2023-08-07 NOTE — TELEPHONE ENCOUNTER
Please request the last completed lab work from her PCP, Dr. Dorina Tineo with [facility]. (Completed about 3-4 months ago)  Phone: [phone]  Fax: [phone]

## 2023-08-07 NOTE — ASSESSMENT & PLAN NOTE
Continue leflunomide 20 mg daily  Continue prednisone 5 mg daily PRN for flare up. She has not needed to use this .     Need to request completed  labwork from Dr. Dorina Perry Internal [facility].

## 2023-08-17 NOTE — TELEPHONE ENCOUNTER
Please let her know that we received the labs from PCP and I reviewed them. Her CBC and kidney function and liver enzymes are all normal. If vitamin D level is low. Her level is 18 and normal is .  Did her PCP give her an rx for weekly vitamin D? If not, I would recommend a weekly Vitamin D supplement that I could send to her pharmacy  She can continue all of her medications that we prescribe.

## 2023-08-17 NOTE — TELEPHONE ENCOUNTER
Spoke with patient verbalized understanding. She was prescribed vitamin D by her pcp which was too strong and she couldn't tolerate it . She is wanting to take a otc vitamin D. She did state that she is having neck pain . She did speak with her pcp and was prescribed antibiotics thinking it could be her sinuses or some other illness that could be treated with antibiotics. She did state that the neck pain is getting worse and she doesn't know what it could be from . Please Advise. Thanks

## 2023-08-18 NOTE — TELEPHONE ENCOUNTER
Called patient left detailed voicemail advising of your message and advised the patient to call the office if she has any further questions or concerns.

## 2023-08-18 NOTE — TELEPHONE ENCOUNTER
Noted; OTC vitamin D is fine. As for the neck pain, I am not sure what it is from either. If it is muscular in nature, than the Tizanidine can help it. Also Magnesium oxide 400 mg OTC vitamin also helps relax the muscle which can help.

## 2024-01-08 PROBLEM — E55.9 VITAMIN D DEFICIENCY: Status: ACTIVE | Noted: 2024-01-08

## 2024-01-08 NOTE — PROGRESS NOTES
"Subjective:           Patient ID: Cindy Goncalves is a 68 y.o. female.    Chief Complaint: Follow-up (Follow up/ spoke about gastro issues . Medication questions before upcoming surgery.)      Ms. Goncalves is a 68-year-old here for a follow-up.  She initiated care with Dr. Padilla on 8/16/22 and was initiated on leflunomide. The patient has psoriasis on her elbows posterior aspect in the past but not currently  and she has sausage toes bilaterally. At her last visit she reported she is doing well and this is the "best she's felt in years" She is able to garden and be active with little pain. At prior visits she was referred to Dr. Leal office and more recently referred to [facility] for skin spot that was dark with atypical borders and she does have hx of atypical mole.    Today January 9, 2024: Recently hospitalized with small bowel construction. 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 Most recent hospitalization was last week. She does have appointments for more testing with GI surgeon and gastro to evaluate the need for a possibility of future intestinal surgery. She also has fei appointment in the near future with a cardiologist for an abnormal EKG and cardiac clearance. Since her last visit, she was evaluated by [facility] for atypical mole and was told it was a sun spot. Prescribed ointment and leave in liquid for scalp for psoriasis. She does endorse antibiotics for enlarged lymph node which has now resolved. She was also prescribed antibiotics for upper respiratory infection that she finished 2 weeks ago. Reports her joint pain/stiffness has remained minimal and the Leflunomide continues to work well for her. Her blood pressure is elevated today but reports her numbers are actually much better and she is following closely by PCP    Followed by Dr. Spencer GI surgeon for small bowel obstruction  Followed by [facility]- [facility]  Followed " "by Cardiology- new appointment next for abnormal EKG.    Hx of cancer:  5 years ago- hysterectomy- then came back and completed radiation 3 years ago    Hx of infection resulting from dental cleaning and dental fillings- dentist uses pyophylactic antibiotics before cleaning    Denies history of fevers, rashes, photosensitivity, oral or nasal ulcers, h/o MI, stroke, seizures, h/o PE or DVT, , uveitis.      Family history of autoimmune disease: None  Pregnancies: 1 Miscarriages: 0  Smoking: Previous Smoker Quit in 2005               Review of Systems   Constitutional:  Negative for appetite change, chills and fever.   HENT:  Negative for congestion, ear pain, mouth sores, nosebleeds and trouble swallowing.    Eyes:  Negative for photophobia and discharge.   Respiratory:  Negative for chest tightness and shortness of breath.    Cardiovascular:  Negative for chest pain.   Gastrointestinal:  Negative for abdominal pain and vomiting.   Endocrine: Negative.    Genitourinary:  Negative for hematuria.   Musculoskeletal:         As per HPI   Skin:  Negative for rash.        Psoriasis   Cracking on right hand between first and second digits   Neurological:  Negative for weakness.         Objective:   BP (!) 160/70 (BP Location: Left arm, Patient Position: Sitting, BP Method: Medium (Manual))   Pulse 98   Temp 98.3 °F (36.8 °C) (Oral)   Resp 18   Ht 5' 2" (1.575 m)   Wt 67.3 kg (148 lb 6.4 oz)   SpO2 97%   BMI 27.14 kg/m²          Physical Exam   Constitutional: She is oriented to person, place, and time. She appears well-developed and well-nourished. No distress.   HENT:   Head: Normocephalic and atraumatic.   Right Ear: External ear normal.   Left Ear: External ear normal.   Eyes: Pupils are equal, round, and reactive to light.   Cardiovascular: Normal rate.   Pulmonary/Chest: Effort normal.   Abdominal: Soft. There is no abdominal tenderness.   Musculoskeletal:      Cervical back: Neck supple.      Comments: Sausage " toes   Neurological: She is alert and oriented to person, place, and time. She displays normal reflexes. No cranial nerve deficit or sensory deficit. She exhibits normal muscle tone. Coordination normal.   Skin: No rash noted. No erythema.   Vitals reviewed.      Right Side Rheumatological Exam     Examination finds the 1st PIP, 1st MCP, 2nd PIP, 2nd MCP, 3rd PIP, 3rd MCP, 4th PIP, 4th MCP, 5th PIP and 5th MCP normal.    Left Side Rheumatological Exam     Examination finds the 1st PIP, 1st MCP, 2nd PIP, 2nd MCP, 3rd PIP, 3rd MCP, 4th PIP, 4th MCP, 5th PIP and 5th MCP normal.           Completed Fibromyalgia exam 18/18 tender points.    No data to display     Assessment:         Medication List with Changes/Refills   Current Medications    ALLERGY RELIEF, FEXOFENADINE, 180 MG TABLET    Take 180 mg by mouth once daily.       Start Date: 3/7/2022  End Date: --    AMLODIPINE (NORVASC) 2.5 MG TABLET    Take 2.5 mg by mouth once daily.       Start Date: 12/29/2023End Date: --    BENAZEPRIL (LOTENSIN) 40 MG TABLET    Take 40 mg by mouth 2 (two) times a day.       Start Date: 11/8/2021 End Date: --    CLORAZEPATE (TRANXENE) 7.5 MG TAB    Take 7.5 mg by mouth 3 (three) times daily as needed.       Start Date: 8/8/2022  End Date: --    FAMOTIDINE (PEPCID) 40 MG TABLET    Take 40 mg by mouth every morning.       Start Date: 8/8/2022  End Date: --    FLUOCINONIDE (LIDEX) 0.05 % EXTERNAL SOLUTION    Apply topically daily as needed.       Start Date: 8/10/2023 End Date: --    FLUTICASONE PROPIONATE (FLONASE) 50 MCG/ACTUATION NASAL SPRAY    2 sprays by Each Nostril route as needed.       Start Date: 3/7/2022  End Date: --    LOVASTATIN (MEVACOR) 40 MG TABLET    Take 40 mg by mouth nightly as needed.       Start Date: 7/3/2022  End Date: --    TIZANIDINE (ZANAFLEX) 2 MG TABLET    Take 1 tablet (2 mg total) by mouth nightly.       Start Date: 8/7/2023  End Date: 2/3/2024   Changed and/or Refilled Medications    Modified Medication  Previous Medication    LEFLUNOMIDE (ARAVA) 20 MG TAB leflunomide (ARAVA) 20 MG Tab       Take 1 tablet (20 mg total) by mouth daily with dinner or evening meal.    Take 1 tablet (20 mg total) by mouth daily with dinner or evening meal.       Start Date: 1/9/2024  End Date: --    Start Date: 8/7/2023  End Date: 1/9/2024   Discontinued Medications    PREDNISONE (DELTASONE) 5 MG TABLET    Take 1 tablet (5 mg total) by mouth daily as needed (for flare up). AFTER BREAKFAST       Start Date: 4/18/2023 End Date: 1/9/2024         ICD-10-CM ICD-9-CM   1. Psoriatic arthritis  L40.50 696.0   2. Psoriasis  L40.9 696.1   3. Fibromyalgia  M79.7 729.1   4. Vitamin D deficiency  E55.9 268.9   5. Enthesopathy  M77.9 726.90             Plan:       1. Psoriatic arthritis  Assessment & Plan:  Stable. Minimal morning stiffness. No synovitis on exam.  Continue leflunomide 20 mg daily     Labs from January 2024 from most recent hospitalization at [facility] available for review in the EMR-  Reviewed with the patient today. AST ALT WNL Cr normal    For reference if does need future intestinal surgery: She was instructed to stop Leflunomide 1 week before and 2 weeks after intestinal surgery if there are no infection or complications from the surgery.  Discussed this a very conservative because ACR has guidance to continue the use of Leflunomide during the perioperatively period. She is not prescribed any NSAIDS by me but she should also stop any NSAIDS 7 days before surgery.    Orders:  -     leflunomide (ARAVA) 20 MG Tab; Take 1 tablet (20 mg total) by mouth daily with dinner or evening meal.  Dispense: 30 tablet; Refill: 5    2. Psoriasis  Assessment & Plan:  Past psoriasis on  bilateral elbows and knees  Currently stable  Since her last office visit she established care with [facility].     Past psoriasis on  bilateral elbows and knees          3. Fibromyalgia  Assessment & Plan:   Previously prescribed  Tizanidine for symptoms  related to Fibro. She is aware that she is no longer able to get future refills of these medications as Dr. Padilla is no longer here. Discussed options such as pain management, PCP or following Dr. Padilla to continue her current medication treatment of Fibro. She did say that her PCP was going to prescribe this medication for her.    Fibromyalgia is a chronic pain syndrome.  Underlying causes of fibromyalgia may be numerous.  An underlying nonrestorative sleep pattern, mental and physical stressors play a role in pain perception.  It is important to reduce stress levels and improving sleep patterns/hygiene.   Exercise and a healthy life-style is important in management of this syndrome.              4. Vitamin D deficiency    5. Enthesopathy  -     leflunomide (ARAVA) 20 MG Tab; Take 1 tablet (20 mg total) by mouth daily with dinner or evening meal.  Dispense: 30 tablet; Refill: 5

## 2024-01-08 NOTE — ASSESSMENT & PLAN NOTE
Previously prescribed  Tizanidine for symptoms related to Fibro. She is aware that she is no longer able to get future refills of these medications as Dr. Padilla is no longer here. Discussed options such as pain management, PCP or following Dr. Padilla to continue her current medication treatment of Fibro. She did say that her PCP was going to prescribe this medication for her.    Fibromyalgia is a chronic pain syndrome.  Underlying causes of fibromyalgia may be numerous.  An underlying nonrestorative sleep pattern, mental and physical stressors play a role in pain perception.  It is important to reduce stress levels and improving sleep patterns/hygiene.   Exercise and a healthy life-style is important in management of this syndrome.

## 2024-01-08 NOTE — ASSESSMENT & PLAN NOTE
Stable. Minimal morning stiffness. No synovitis on exam.  Continue leflunomide 20 mg daily     Labs from January 2024 from most recent hospitalization at [facility] available for review in the EMR-  Reviewed with the patient today. AST ALT WNL Cr normal    For reference if does need future intestinal surgery: She was instructed to stop Leflunomide 1 week before and 2 weeks after intestinal surgery if there are no infection or complications from the surgery.  Discussed this a very conservative because ACR has guidance to continue the use of Leflunomide during the perioperatively period. She is not prescribed any NSAIDS by me but she should also stop any NSAIDS 7 days before surgery.

## 2024-01-08 NOTE — ASSESSMENT & PLAN NOTE
Past psoriasis on  bilateral elbows and knees  Currently stable  Since her last office visit she established care with [facility].     Past psoriasis on  bilateral elbows and knees

## 2024-01-09 ENCOUNTER — OFFICE VISIT (OUTPATIENT)
Dept: RHEUMATOLOGY | Facility: CLINIC | Age: 69
End: 2024-01-09
Payer: MEDICARE

## 2024-01-09 VITALS
SYSTOLIC BLOOD PRESSURE: 160 MMHG | WEIGHT: 148.38 LBS | OXYGEN SATURATION: 97 % | DIASTOLIC BLOOD PRESSURE: 70 MMHG | TEMPERATURE: 98 F | HEIGHT: 62 IN | HEART RATE: 98 BPM | BODY MASS INDEX: 27.3 KG/M2 | RESPIRATION RATE: 18 BRPM

## 2024-01-09 DIAGNOSIS — M79.7 FIBROMYALGIA: ICD-10-CM

## 2024-01-09 DIAGNOSIS — L40.50 PSORIATIC ARTHRITIS: Primary | ICD-10-CM

## 2024-01-09 DIAGNOSIS — M77.9 ENTHESOPATHY: ICD-10-CM

## 2024-01-09 DIAGNOSIS — L40.9 PSORIASIS: ICD-10-CM

## 2024-01-09 DIAGNOSIS — E55.9 VITAMIN D DEFICIENCY: ICD-10-CM

## 2024-01-09 PROCEDURE — 99999 PR PBB SHADOW E&M-EST. PATIENT-LVL V: CPT | Mod: PBBFAC,,,

## 2024-01-09 PROCEDURE — 99214 OFFICE O/P EST MOD 30 MIN: CPT | Mod: S$GLB,,,

## 2024-01-09 RX ORDER — AMLODIPINE BESYLATE 2.5 MG/1
2.5 TABLET ORAL DAILY
COMMUNITY
Start: 2023-12-29

## 2024-01-09 RX ORDER — FLUOCINONIDE TOPICAL SOLUTION USP, 0.05% 0.5 MG/ML
SOLUTION TOPICAL DAILY PRN
COMMUNITY
Start: 2023-08-10

## 2024-01-09 RX ORDER — LEFLUNOMIDE 20 MG/1
20 TABLET ORAL
Qty: 30 TABLET | Refills: 5 | Status: SHIPPED | OUTPATIENT
Start: 2024-01-09 | End: 2024-02-06 | Stop reason: SDUPTHER

## 2024-02-05 ENCOUNTER — TELEPHONE (OUTPATIENT)
Dept: RHEUMATOLOGY | Facility: CLINIC | Age: 69
End: 2024-02-05
Payer: MEDICARE

## 2024-02-05 NOTE — TELEPHONE ENCOUNTER
I called the patient to see what procedure she was having. She is having an EGD. She was instructed that she is able to continue the Leflunomide and Tizanidine for this procedure. She is aware if she does need to have intestinal surgery that she would need to stop the Leflunomide and she'll call back for recommendations if she would need that surgery in the future. Patient voiced understanding.

## 2024-02-06 DIAGNOSIS — L40.50 PSORIATIC ARTHRITIS: ICD-10-CM

## 2024-02-06 DIAGNOSIS — M77.9 ENTHESOPATHY: ICD-10-CM

## 2024-02-06 RX ORDER — LEFLUNOMIDE 20 MG/1
20 TABLET ORAL
Qty: 30 TABLET | Refills: 5 | Status: SHIPPED | OUTPATIENT
Start: 2024-02-06 | End: 2024-03-11 | Stop reason: SDUPTHER

## 2024-03-11 DIAGNOSIS — L40.50 PSORIATIC ARTHRITIS: ICD-10-CM

## 2024-03-11 DIAGNOSIS — M77.9 ENTHESOPATHY: ICD-10-CM

## 2024-03-11 RX ORDER — LEFLUNOMIDE 20 MG/1
20 TABLET ORAL
Qty: 30 TABLET | Refills: 5 | Status: SHIPPED | OUTPATIENT
Start: 2024-03-11

## 2024-03-11 NOTE — TELEPHONE ENCOUNTER
Patient would like for the Leflunomide to be sent to [facility] in [place] .  She had a bad experience with [facility] and [facility].   Medication and pharmacy pended. Please Advise. Thanks

## 2024-07-08 NOTE — PROGRESS NOTES
"Subjective:           Patient ID: Cindy Goncalves is a 68 y.o. female.    Chief Complaint: Follow-up (Bilateral hand pain , swelling, skin cracking and bleeding in the hands ./Right knee pain )      Ms. Goncalves is a 68-year-old here for a follow-up.  She initiated care with Dr. Padilla on 8/16/22 and was initiated on leflunomide. The patient had psoriasis on her elbows posterior aspect in the past but not currently  and she has sausage toes bilaterally. At her last visit she reported she is doing well and this is the "best she's felt in years" She is able to garden and be active with little pain. At prior visits she was referred to Dr. Leal office and more recently referred to [facility] for skin spot that was dark with atypical borders and she does have hx of atypical mole.    Followed by Dr. Spencer GI surgeon for small bowel obstruction  Followed by GI- Sarah  Followed by Cardiology- new appointment next for abnormal EKG.    Hx of cancer:  5 years ago- hysterectomy- then came back and completed radiation 3 years ago  Hx of Back surgery about 2001    Hx of infection resulting from dental cleaning and dental fillings- dentist uses pyophylactic antibiotics before cleaning    Denies history of fevers, rashes, photosensitivity, oral or nasal ulcers, h/o MI, stroke, seizures, h/o PE or DVT, , uveitis.      Family history of autoimmune disease: None  Pregnancies: 1 Miscarriages: 0  Smoking: Previous Smoker Quit in 2005 January 9, 2024: Recently hospitalized with small bowel construction. 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 Most recent hospitalization was last week. She does have appointments for more testing with GI surgeon and gastro to evaluate the need for a possibility of future intestinal surgery. She also has fei appointment in the near future with a cardiologist for an abnormal EKG and cardiac clearance. Since her last visit, she " was evaluated by [facility] for atypical mole and was told it was a sun spot. Prescribed ointment and leave in liquid for scalp for psoriasis. She does endorse antibiotics for enlarged lymph node which has now resolved. She was also prescribed antibiotics for upper respiratory infection that she finished 2 weeks ago. Reports her joint pain/stiffness has remained minimal and the Leflunomide continues to work well for her. Her blood pressure is elevated today but reports her numbers are actually much better and she is following closely by PCP    Today July 2024: Continues on Leflunomide 20 mg nightly. Reports she is having increased joint pain, including pain in the right knee and lower back. Reports pain in her hands have increased and the increased pain is worse during the day and after using her hands. She is having increased skin cracking of the hands occasionally causing bleeding. She is using topical triamincolone and neosporin with improvement.  No new patches of psoriasis and no active psoriasis today. She has not had surgery for bowel obstruction and is following and completing imaging for Gastro related to this issue. Denies any recent infections.        Review of Systems   Constitutional:  Negative for appetite change, chills and fever.   HENT:  Negative for congestion, ear pain, mouth sores, nosebleeds and trouble swallowing.    Eyes:  Negative for photophobia and discharge.   Respiratory:  Negative for chest tightness and shortness of breath.    Cardiovascular:  Negative for chest pain.   Gastrointestinal:  Negative for abdominal pain and vomiting.   Endocrine: Negative.    Genitourinary:  Negative for hematuria.   Musculoskeletal:         As per HPI   Skin:  Negative for rash.        Psoriasis   Cracking on bilateral hand on palm and between digits   Neurological:  Negative for weakness.         Objective:   BP (!) 140/80 (BP Location: Left arm, Patient Position: Sitting, BP Method: Medium  "(Manual))   Pulse 90   Temp 98.3 °F (36.8 °C) (Oral)   Resp 18   Ht 5' 2" (1.575 m)   Wt 67.5 kg (148 lb 12.8 oz)   SpO2 95%   BMI 27.22 kg/m²          Physical Exam   Constitutional: She is oriented to person, place, and time. She appears well-developed and well-nourished. No distress.   HENT:   Head: Normocephalic and atraumatic.   Right Ear: External ear normal.   Left Ear: External ear normal.   Eyes: Pupils are equal, round, and reactive to light.   Cardiovascular: Normal rate.   Murmur heard.  Pulmonary/Chest: Effort normal.   Abdominal: Soft. There is no abdominal tenderness.   Musculoskeletal:      Cervical back: Neck supple.      Comments: Sausage toes   Neurological: She is alert and oriented to person, place, and time. She displays normal reflexes. No cranial nerve deficit or sensory deficit. She exhibits normal muscle tone. Coordination normal.   Skin: No rash noted. No erythema.   Vitals reviewed.      Right Side Rheumatological Exam     Examination finds the 1st PIP, 1st MCP, 2nd PIP, 2nd MCP, 3rd PIP, 3rd MCP, 4th PIP, 4th MCP, 5th PIP and 5th MCP normal.    Left Side Rheumatological Exam     Examination finds the 1st PIP, 1st MCP, 2nd PIP, 2nd MCP, 3rd PIP, 3rd MCP, 4th PIP, 4th MCP, 5th PIP and 5th MCP normal.         No data to display     Assessment:         Medication List with Changes/Refills   New Medications    DICLOFENAC SODIUM (VOLTAREN) 1 % GEL    Apply 2 g topically 4 (four) times daily.       Start Date: 7/9/2024  End Date: --   Current Medications    BENAZEPRIL (LOTENSIN) 40 MG TABLET    Take 40 mg by mouth 2 (two) times a day.       Start Date: 11/8/2021 End Date: --    CHOLESTYRAMINE-ASPARTAME (QUESTRAN LIGHT) 4 GRAM PWPK    Take by mouth.       Start Date: 2/14/2024 End Date: --    CLORAZEPATE (TRANXENE) 7.5 MG TAB    Take 7.5 mg by mouth 3 (three) times daily as needed.       Start Date: 8/8/2022  End Date: --    FAMOTIDINE (PEPCID) 40 MG TABLET    Take 40 mg by mouth every " morning.       Start Date: 8/8/2022  End Date: --    FLUTICASONE PROPIONATE (FLONASE) 50 MCG/ACTUATION NASAL SPRAY    2 sprays by Each Nostril route as needed.       Start Date: 3/7/2022  End Date: --    LEFLUNOMIDE (ARAVA) 20 MG TAB    Take 1 tablet (20 mg total) by mouth daily with dinner or evening meal.       Start Date: 3/11/2024 End Date: --    LOVASTATIN (MEVACOR) 40 MG TABLET    Take 40 mg by mouth nightly as needed.       Start Date: 7/3/2022  End Date: --    ONDANSETRON (ZOFRAN) 4 MG TABLET    Take 4 mg by mouth every 8 (eight) hours as needed.       Start Date: --        End Date: --    TIZANIDINE 2 MG CAP    Take 1 capsule by mouth every evening.       Start Date: --        End Date: --   Discontinued Medications    ALLERGY RELIEF, FEXOFENADINE, 180 MG TABLET    Take 180 mg by mouth once daily.       Start Date: 3/7/2022  End Date: 7/9/2024    AMLODIPINE (NORVASC) 2.5 MG TABLET    Take 2.5 mg by mouth once daily.       Start Date: 12/29/2023End Date: 7/9/2024    FLUOCINONIDE (LIDEX) 0.05 % EXTERNAL SOLUTION    Apply topically daily as needed.       Start Date: 8/10/2023 End Date: 7/9/2024         ICD-10-CM ICD-9-CM   1. Psoriatic arthritis  L40.50 696.0   2. Osteoarthritis of both hands, unspecified osteoarthritis type  M19.041 715.94    M19.042    3. Drug-induced immunodeficiency  D84.821 279.3    Z79.899 E947.9   4. History of atypical skin mole  Z86.018 V13.3   5. Psoriasis  L40.9 696.1   6. Fibromyalgia  M79.7 729.1               Plan:       1. Psoriatic arthritis  Assessment & Plan:  Stable. Minimal morning stiffness. No synovitis on exam. She has increased pain in her joints. Reviewed xray with the patient with severe degenerative changes in hand xray from 2022. She is having more pain after activity. Discussed Voltaren gel, warm water, and arthritis gloves.    Continue leflunomide 20 mg daily     She has blood work schedueld in the next weeks with PCP- we can request those labs     She is not  scheduled but for reference if she does need future intestinal surgery: She was instructed to stop Leflunomide 1 week before and 2 weeks after intestinal surgery if there are no infection or complications from the surgery.  Discussed this a very conservative because ACR has guidance to continue the use of Leflunomide during the perioperatively period. If no signs of infection she could restart 1 week after the surgery. She is not prescribed any NSAIDS by me but she should also stop any NSAIDS 7 days before surgery.    Orders:  -     diclofenac sodium (VOLTAREN) 1 % Gel; Apply 2 g topically 4 (four) times daily.  Dispense: 100 g; Refill: 5    2. Osteoarthritis of both hands, unspecified osteoarthritis type  Assessment & Plan:  6/29/22-Xray of bilateral hands: There is severe degenerative arthrosis at the left index and long finger DIP joints as well as at the little finger DIP and PIP joints. There is mild degenerative arthrosis at the remaining IP and MCP joints on the left. There is severe degenerative arthrosis at the right little finger IP joints with mild degenerative arthrosis at the remaining right IP and MCP joints. Mild soft tissue swelling is present at the severely degenerated joints. There are no findings of inflammatory arthritis and no chondrocalcinosis. Impression: Degenerative arthrosis at both hands as detailed above with no acute pathology identified.     She is having increased joint pain after activity and during the day while using them. Reviewed above xrays findings with the patient. Discussed topical Voltaren Gel (rx sent to pharmacy), warm water, compression/arthritis gloves.    Orders:  -     diclofenac sodium (VOLTAREN) 1 % Gel; Apply 2 g topically 4 (four) times daily.  Dispense: 100 g; Refill: 5    3. Drug-induced immunodeficiency  Assessment & Plan:  Compromised immune system s/t autoimmune disease and use of immunosuppressive medications.   - Advised strict adherence to age appropriate  vaccinations and cancer screenings- including yearly skin exams  - Advised to get immediate medical care if any infection.   - hold Leflunomide with infections        4. History of atypical skin mole  Assessment & Plan:  Followed by [facility]         5. Psoriasis  Assessment & Plan:  Currently stable- no recent outbreak and no new patches  Past psoriasis on  bilateral elbows and knees  She is established care with [facility].           6. Fibromyalgia  Assessment & Plan:  Stable  She is taking Tizanidine     Fibromyalgia is a chronic pain syndrome.  Underlying causes of fibromyalgia may be numerous.  An underlying nonrestorative sleep pattern, mental and physical stressors play a role in pain perception.  It is important to reduce stress levels and improving sleep patterns/hygiene.   Exercise and a healthy life-style is important in management of this syndrome                     35 minutes of total time spent on the encounter, which includes face to face time and non-face to face time preparing to see the patient (eg, review of tests), Obtaining and/or reviewing separately obtained history, Documenting clinical information in the electronic or other health record, Independently interpreting results (not separately reported) and communicating results to the patient/family/caregiver, or Care coordination (not separately reported).

## 2024-07-09 ENCOUNTER — OFFICE VISIT (OUTPATIENT)
Dept: RHEUMATOLOGY | Facility: CLINIC | Age: 69
End: 2024-07-09
Payer: MEDICARE

## 2024-07-09 VITALS
SYSTOLIC BLOOD PRESSURE: 140 MMHG | WEIGHT: 148.81 LBS | DIASTOLIC BLOOD PRESSURE: 80 MMHG | OXYGEN SATURATION: 95 % | BODY MASS INDEX: 27.38 KG/M2 | TEMPERATURE: 98 F | HEART RATE: 90 BPM | RESPIRATION RATE: 18 BRPM | HEIGHT: 62 IN

## 2024-07-09 DIAGNOSIS — D84.821 DRUG-INDUCED IMMUNODEFICIENCY: ICD-10-CM

## 2024-07-09 DIAGNOSIS — M79.7 FIBROMYALGIA: ICD-10-CM

## 2024-07-09 DIAGNOSIS — M19.042 OSTEOARTHRITIS OF BOTH HANDS, UNSPECIFIED OSTEOARTHRITIS TYPE: ICD-10-CM

## 2024-07-09 DIAGNOSIS — Z79.899 DRUG-INDUCED IMMUNODEFICIENCY: ICD-10-CM

## 2024-07-09 DIAGNOSIS — L40.50 PSORIATIC ARTHRITIS: Primary | ICD-10-CM

## 2024-07-09 DIAGNOSIS — Z86.018 HISTORY OF ATYPICAL SKIN MOLE: ICD-10-CM

## 2024-07-09 DIAGNOSIS — M19.041 OSTEOARTHRITIS OF BOTH HANDS, UNSPECIFIED OSTEOARTHRITIS TYPE: ICD-10-CM

## 2024-07-09 DIAGNOSIS — L40.9 PSORIASIS: ICD-10-CM

## 2024-07-09 PROCEDURE — 1159F MED LIST DOCD IN RCRD: CPT | Mod: CPTII,S$GLB,,

## 2024-07-09 PROCEDURE — 1101F PT FALLS ASSESS-DOCD LE1/YR: CPT | Mod: CPTII,S$GLB,,

## 2024-07-09 PROCEDURE — 3079F DIAST BP 80-89 MM HG: CPT | Mod: CPTII,S$GLB,,

## 2024-07-09 PROCEDURE — 4010F ACE/ARB THERAPY RXD/TAKEN: CPT | Mod: CPTII,S$GLB,,

## 2024-07-09 PROCEDURE — 99214 OFFICE O/P EST MOD 30 MIN: CPT | Mod: S$GLB,,,

## 2024-07-09 PROCEDURE — 3008F BODY MASS INDEX DOCD: CPT | Mod: CPTII,S$GLB,,

## 2024-07-09 PROCEDURE — 3077F SYST BP >= 140 MM HG: CPT | Mod: CPTII,S$GLB,,

## 2024-07-09 PROCEDURE — 99999 PR PBB SHADOW E&M-EST. PATIENT-LVL V: CPT | Mod: PBBFAC,,,

## 2024-07-09 PROCEDURE — 1126F AMNT PAIN NOTED NONE PRSNT: CPT | Mod: CPTII,S$GLB,,

## 2024-07-09 PROCEDURE — 3288F FALL RISK ASSESSMENT DOCD: CPT | Mod: CPTII,S$GLB,,

## 2024-07-09 RX ORDER — TIZANIDINE HYDROCHLORIDE 2 MG/1
1 CAPSULE, GELATIN COATED ORAL NIGHTLY
COMMUNITY

## 2024-07-09 RX ORDER — ONDANSETRON 4 MG/1
4 TABLET, FILM COATED ORAL EVERY 8 HOURS PRN
COMMUNITY

## 2024-07-09 RX ORDER — CHOLESTYRAMINE 4 G/4.8G
POWDER, FOR SUSPENSION ORAL
COMMUNITY
Start: 2024-02-14

## 2024-07-09 RX ORDER — DICLOFENAC SODIUM 10 MG/G
2 GEL TOPICAL 4 TIMES DAILY
Qty: 100 G | Refills: 5 | Status: SHIPPED | OUTPATIENT
Start: 2024-07-09

## 2024-07-09 NOTE — ASSESSMENT & PLAN NOTE
Stable. Minimal morning stiffness. No synovitis on exam. She has increased pain in her joints. Reviewed xray with the patient with severe degenerative changes in hand xray from 2022. She is having more pain after activity. Discussed Voltaren gel, warm water, and arthritis gloves.    Continue leflunomide 20 mg daily     She has blood work schedueld in the next weeks with PCP- we can request those labs     She is not scheduled but for reference if she does need future intestinal surgery: She was instructed to stop Leflunomide 1 week before and 2 weeks after intestinal surgery if there are no infection or complications from the surgery.  Discussed this a very conservative because ACR has guidance to continue the use of Leflunomide during the perioperatively period. If no signs of infection she could restart 1 week after the surgery. She is not prescribed any NSAIDS by me but she should also stop any NSAIDS 7 days before surgery.

## 2024-07-09 NOTE — ASSESSMENT & PLAN NOTE
Currently stable- no recent outbreak and no new patches  Past psoriasis on  bilateral elbows and knees  She is established care with [facility].

## 2024-07-09 NOTE — ASSESSMENT & PLAN NOTE
Stable  She is taking Tizanidine     Fibromyalgia is a chronic pain syndrome.  Underlying causes of fibromyalgia may be numerous.  An underlying nonrestorative sleep pattern, mental and physical stressors play a role in pain perception.  It is important to reduce stress levels and improving sleep patterns/hygiene.   Exercise and a healthy life-style is important in management of this syndrome

## 2024-07-10 PROBLEM — M19.042 OSTEOARTHRITIS OF BOTH HANDS: Status: ACTIVE | Noted: 2024-07-10

## 2024-07-10 PROBLEM — M19.041 OSTEOARTHRITIS OF BOTH HANDS: Status: ACTIVE | Noted: 2024-07-10

## 2024-07-10 PROBLEM — Z79.899 DRUG-INDUCED IMMUNODEFICIENCY: Status: ACTIVE | Noted: 2024-07-10

## 2024-07-10 PROBLEM — D84.821 DRUG-INDUCED IMMUNODEFICIENCY: Status: ACTIVE | Noted: 2024-07-10

## 2024-07-10 NOTE — ASSESSMENT & PLAN NOTE
6/29/22-Xray of bilateral hands: There is severe degenerative arthrosis at the left index and long finger DIP joints as well as at the little finger DIP and PIP joints. There is mild degenerative arthrosis at the remaining IP and MCP joints on the left. There is severe degenerative arthrosis at the right little finger IP joints with mild degenerative arthrosis at the remaining right IP and MCP joints. Mild soft tissue swelling is present at the severely degenerated joints. There are no findings of inflammatory arthritis and no chondrocalcinosis. Impression: Degenerative arthrosis at both hands as detailed above with no acute pathology identified.     She is having increased joint pain after activity and during the day while using them. Reviewed above xrays findings with the patient. Discussed topical Voltaren Gel (rx sent to pharmacy), warm water, compression/arthritis gloves.

## 2024-07-10 NOTE — ASSESSMENT & PLAN NOTE
Compromised immune system s/t autoimmune disease and use of immunosuppressive medications.   - Advised strict adherence to age appropriate vaccinations and cancer screenings- including yearly skin exams  - Advised to get immediate medical care if any infection.   - hold Leflunomide with infections

## 2024-08-22 DIAGNOSIS — M77.9 ENTHESOPATHY: ICD-10-CM

## 2024-08-22 DIAGNOSIS — L40.50 PSORIATIC ARTHRITIS: ICD-10-CM

## 2024-08-23 RX ORDER — LEFLUNOMIDE 20 MG/1
TABLET ORAL
Qty: 90 TABLET | Refills: 1 | Status: SHIPPED | OUTPATIENT
Start: 2024-08-23

## 2024-09-19 DIAGNOSIS — M25.532 LEFT WRIST PAIN: Primary | ICD-10-CM

## 2024-10-07 ENCOUNTER — TELEPHONE (OUTPATIENT)
Dept: ORTHOPEDICS | Facility: CLINIC | Age: 69
End: 2024-10-07
Payer: MEDICARE

## 2024-10-07 NOTE — TELEPHONE ENCOUNTER
Attempted to call patient to ask her to bring Disc with imaging and Xr report that was done on 9/16/24.

## 2024-10-09 ENCOUNTER — OFFICE VISIT (OUTPATIENT)
Dept: ORTHOPEDICS | Facility: CLINIC | Age: 69
End: 2024-10-09
Payer: MEDICARE

## 2024-10-09 VITALS
HEIGHT: 62 IN | WEIGHT: 148.81 LBS | HEART RATE: 90 BPM | DIASTOLIC BLOOD PRESSURE: 90 MMHG | BODY MASS INDEX: 27.38 KG/M2 | SYSTOLIC BLOOD PRESSURE: 158 MMHG

## 2024-10-09 DIAGNOSIS — M65.4 DE QUERVAIN'S TENOSYNOVITIS: Primary | ICD-10-CM

## 2024-10-09 DIAGNOSIS — M25.532 LEFT WRIST PAIN: ICD-10-CM

## 2024-10-09 PROCEDURE — 99204 OFFICE O/P NEW MOD 45 MIN: CPT | Mod: 25,,, | Performed by: REHABILITATION UNIT

## 2024-10-09 PROCEDURE — 4010F ACE/ARB THERAPY RXD/TAKEN: CPT | Mod: CPTII,,, | Performed by: REHABILITATION UNIT

## 2024-10-09 PROCEDURE — 20550 NJX 1 TENDON SHEATH/LIGAMENT: CPT | Mod: LT,,, | Performed by: REHABILITATION UNIT

## 2024-10-09 PROCEDURE — 3080F DIAST BP >= 90 MM HG: CPT | Mod: CPTII,,, | Performed by: REHABILITATION UNIT

## 2024-10-09 PROCEDURE — 3288F FALL RISK ASSESSMENT DOCD: CPT | Mod: CPTII,,, | Performed by: REHABILITATION UNIT

## 2024-10-09 PROCEDURE — 3077F SYST BP >= 140 MM HG: CPT | Mod: CPTII,,, | Performed by: REHABILITATION UNIT

## 2024-10-09 PROCEDURE — 1159F MED LIST DOCD IN RCRD: CPT | Mod: CPTII,,, | Performed by: REHABILITATION UNIT

## 2024-10-09 PROCEDURE — 3008F BODY MASS INDEX DOCD: CPT | Mod: CPTII,,, | Performed by: REHABILITATION UNIT

## 2024-10-09 PROCEDURE — 1101F PT FALLS ASSESS-DOCD LE1/YR: CPT | Mod: CPTII,,, | Performed by: REHABILITATION UNIT

## 2024-10-09 RX ORDER — PREDNISONE 50 MG/1
50 TABLET ORAL
COMMUNITY
Start: 2024-06-18

## 2024-10-09 RX ADMIN — BETAMETHASONE SODIUM PHOSPHATE AND BETAMETHASONE ACETATE 6 MG: 3; 3 INJECTION, SUSPENSION INTRA-ARTICULAR; INTRALESIONAL; INTRAMUSCULAR; SOFT TISSUE at 10:10

## 2024-10-09 RX ADMIN — LIDOCAINE HYDROCHLORIDE 1 ML: 10 INJECTION, SOLUTION INFILTRATION; PERINEURAL at 10:10

## 2024-10-27 RX ORDER — BETAMETHASONE SODIUM PHOSPHATE AND BETAMETHASONE ACETATE 3; 3 MG/ML; MG/ML
6 INJECTION, SUSPENSION INTRA-ARTICULAR; INTRALESIONAL; INTRAMUSCULAR; SOFT TISSUE
Status: DISCONTINUED | OUTPATIENT
Start: 2024-10-09 | End: 2024-10-27 | Stop reason: HOSPADM

## 2024-10-27 RX ORDER — LIDOCAINE HYDROCHLORIDE 10 MG/ML
1 INJECTION, SOLUTION INFILTRATION; PERINEURAL
Status: DISCONTINUED | OUTPATIENT
Start: 2024-10-09 | End: 2024-10-27 | Stop reason: HOSPADM

## 2025-01-09 ENCOUNTER — OFFICE VISIT (OUTPATIENT)
Dept: RHEUMATOLOGY | Facility: CLINIC | Age: 70
End: 2025-01-09
Payer: MEDICARE

## 2025-01-09 VITALS
HEIGHT: 62 IN | RESPIRATION RATE: 18 BRPM | SYSTOLIC BLOOD PRESSURE: 138 MMHG | HEART RATE: 98 BPM | OXYGEN SATURATION: 95 % | WEIGHT: 153 LBS | BODY MASS INDEX: 28.16 KG/M2 | TEMPERATURE: 98 F | DIASTOLIC BLOOD PRESSURE: 80 MMHG

## 2025-01-09 DIAGNOSIS — L40.9 PSORIASIS: ICD-10-CM

## 2025-01-09 DIAGNOSIS — Z79.899 DRUG-INDUCED IMMUNODEFICIENCY: ICD-10-CM

## 2025-01-09 DIAGNOSIS — D84.821 DRUG-INDUCED IMMUNODEFICIENCY: ICD-10-CM

## 2025-01-09 DIAGNOSIS — M19.041 OSTEOARTHRITIS OF BOTH HANDS, UNSPECIFIED OSTEOARTHRITIS TYPE: ICD-10-CM

## 2025-01-09 DIAGNOSIS — L40.50 PSORIATIC ARTHRITIS: Primary | ICD-10-CM

## 2025-01-09 DIAGNOSIS — M19.042 OSTEOARTHRITIS OF BOTH HANDS, UNSPECIFIED OSTEOARTHRITIS TYPE: ICD-10-CM

## 2025-01-09 DIAGNOSIS — M79.7 FIBROMYALGIA: ICD-10-CM

## 2025-01-09 PROCEDURE — 3288F FALL RISK ASSESSMENT DOCD: CPT | Mod: CPTII,S$GLB,,

## 2025-01-09 PROCEDURE — 3008F BODY MASS INDEX DOCD: CPT | Mod: CPTII,S$GLB,,

## 2025-01-09 PROCEDURE — 3075F SYST BP GE 130 - 139MM HG: CPT | Mod: CPTII,S$GLB,,

## 2025-01-09 PROCEDURE — 99999 PR PBB SHADOW E&M-EST. PATIENT-LVL III: CPT | Mod: PBBFAC,,,

## 2025-01-09 PROCEDURE — 1126F AMNT PAIN NOTED NONE PRSNT: CPT | Mod: CPTII,S$GLB,,

## 2025-01-09 PROCEDURE — 1159F MED LIST DOCD IN RCRD: CPT | Mod: CPTII,S$GLB,,

## 2025-01-09 PROCEDURE — 1101F PT FALLS ASSESS-DOCD LE1/YR: CPT | Mod: CPTII,S$GLB,,

## 2025-01-09 PROCEDURE — 3079F DIAST BP 80-89 MM HG: CPT | Mod: CPTII,S$GLB,,

## 2025-01-09 PROCEDURE — 99214 OFFICE O/P EST MOD 30 MIN: CPT | Mod: S$GLB,,,

## 2025-01-09 RX ORDER — PREDNISONE 5 MG/1
5 TABLET ORAL
COMMUNITY

## 2025-01-09 RX ORDER — LORATADINE 10 MG/1
1 TABLET ORAL EVERY MORNING
COMMUNITY
Start: 2024-12-01 | End: 2025-12-01

## 2025-01-09 RX ORDER — LEFLUNOMIDE 20 MG/1
20 TABLET ORAL DAILY
Qty: 90 TABLET | Refills: 1 | Status: SHIPPED | OUTPATIENT
Start: 2025-01-09

## 2025-01-09 NOTE — ASSESSMENT & PLAN NOTE
Minimal morning stiffness. No synovitis on exam. She has increased pain in her left wrist with swelling. She previously received injection for tenosynovitis with some improvement. May need to consider change or upgrade in treatment if continues with the left wrist. Reports she has not used prednisone in almost 1 year for flare up related to PsA. She reports the thumb splint is helping the thumb but aggravating the wrist. Plans to try different splint/ace bandage.    Prednisone 5 mg PRN for flare up- she has not used this in almost 1 year. She will do a short course of Low dose prednisone for flare up. May need to consider other treatment options if persists although she feels like Leflunomide has significantly helped her condition. She plans to f/u with Orthopedic PR[facility] for injection    Continue leflunomide 20 mg daily     Labs ordered today for continued monitoring- she plans to complete these labs at her PCP's office in the near future. Paper orders given

## 2025-01-09 NOTE — PROGRESS NOTES
"Subjective:           Patient ID: Cindy Goncalves is a 69 y.o. female.    Chief Complaint: Follow-up (Left wrist pain )      Ms. Goncalves is a 68-year-old here for a follow-up.  She initiated care with Dr. Padilla on 8/16/22 and was initiated on leflunomide. The patient had psoriasis on her elbows posterior aspect in the past but not currently  and she has sausage toes bilaterally. At her last visit she reported she is doing well and this is the "best she's felt in years" She is able to garden and be active with little pain. At prior visits she was referred to Dr. Leal office and more recently referred to [facility] for skin spot that was dark with atypical borders and she does have hx of atypical mole.    Followed by Dr. Spencer GI surgeon for small bowel obstruction  Followed by GI- Sarah  Followed by Cardiology- new appointment next for abnormal EKG.    Hx of cancer:  5 years ago- hysterectomy- then came back and completed radiation 3 years ago  Hx of Back surgery about 2001    Hx of infection resulting from dental cleaning and dental fillings- dentist uses pyophylactic antibiotics before cleaning    Denies history of fevers, rashes, photosensitivity, oral or nasal ulcers, h/o MI, stroke, seizures, h/o PE or DVT, , uveitis.      Family history of autoimmune disease: None  Pregnancies: 1 Miscarriages: 0  Smoking: Previous Smoker Quit in 2005 January 9, 2024: Recently hospitalized with small bowel construction. 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 Most recent hospitalization was last week. She does have appointments for more testing with GI surgeon and gastro to evaluate the need for a possibility of future intestinal surgery. She also has fei appointment in the near future with a cardiologist for an abnormal EKG and cardiac clearance. Since her last visit, she was evaluated by [facility] for atypical mole and was told it was a " sun spot. Prescribed ointment and leave in liquid for scalp for psoriasis. She does endorse antibiotics for enlarged lymph node which has now resolved. She was also prescribed antibiotics for upper respiratory infection that she finished 2 weeks ago. Reports her joint pain/stiffness has remained minimal and the Leflunomide continues to work well for her. Her blood pressure is elevated today but reports her numbers are actually much better and she is following closely by PCP    Today July 2024: Continues on Leflunomide 20 mg nightly. Reports she is having increased joint pain, including pain in the right knee and lower back. Reports pain in her hands have increased and the increased pain is worse during the day and after using her hands. She is having increased skin cracking of the hands occasionally causing bleeding. She is using topical triamincolone and neosporin with improvement.  No new patches of psoriasis and no active psoriasis today. She has not had surgery for bowel obstruction and is following and completing imaging for Gastro related to this issue. Denies any recent infections.    Today January 2025: Continues on Leflunomide 20 mg nightly. Reports she is having increased overall pain in her knees, hands, hips with the recent weather changes. Does report increased pain with increase activity Since her last visit she was evaluated by Orthopedic and given an injection for De Quervain's Tenosynovitis with improvement. She is having swelling of her left wrist today with some pain near base of thumb. Reports the thumb splint does help with the thumb but is aggravating her wrist, she plans to try a different splint/ace bandage. Eports feeling the beginning of trigger finger in her 5th finger but doesn't get stuck. Denies numbness/tingling. Minimal morning stiffness. No synovitis on physical exam today. Reports she still has the prescriptions of prednisone 5 mg from one year ago, she has not taken it in almost 1  "year for flare up. Reports improvement in skin cracking- she is now using new ointment (fishermans friend?) Does have one small psoriasis patch under left eye. Reports this happens once every 1-2 months. Reports psoriasis and joint pain have both significantly improved after starting the Leflunomide. Does endorse recent infection (sinusitis) requiring abx.        Review of Systems   Constitutional:  Negative for appetite change, chills and fever.   HENT:  Negative for congestion, ear pain, mouth sores, nosebleeds and trouble swallowing.    Eyes:  Negative for photophobia and discharge.   Respiratory:  Negative for chest tightness and shortness of breath.    Cardiovascular:  Negative for chest pain.   Gastrointestinal:  Negative for abdominal pain and vomiting.   Endocrine: Negative.    Genitourinary:  Negative for hematuria.   Musculoskeletal:  Positive for arthralgias and joint swelling.        As per HPI  Left wrist   Skin:  Positive for rash.        Psoriasis   Cracking on bilateral hand on palm and between digits  Psoriasis patch under left eye   Neurological:  Negative for weakness.         Objective:   /80 (BP Location: Right arm, Patient Position: Sitting)   Pulse 98   Temp 98.3 °F (36.8 °C) (Oral)   Resp 18   Ht 5' 2" (1.575 m)   Wt 69.4 kg (153 lb)   SpO2 95%   BMI 27.98 kg/m²          Physical Exam   Constitutional: She is oriented to person, place, and time. She appears well-developed and well-nourished. No distress.   HENT:   Head: Normocephalic and atraumatic.   Right Ear: External ear normal.   Left Ear: External ear normal.   Eyes: Pupils are equal, round, and reactive to light.   Cardiovascular: Normal rate.   Murmur heard.  Pulmonary/Chest: Effort normal.   Musculoskeletal:      Cervical back: Neck supple.      Comments: Sausage toes   Neurological: She is alert and oriented to person, place, and time. She displays normal reflexes. No cranial nerve deficit or sensory deficit. She " exhibits normal muscle tone. Coordination normal.   Skin: No rash noted. No erythema.   Vitals reviewed.      Right Side Rheumatological Exam     Examination finds the 1st PIP, 1st MCP, 2nd PIP, 2nd MCP, 3rd PIP, 3rd MCP, 4th PIP, 4th MCP, 5th PIP and 5th MCP normal.    Left Side Rheumatological Exam     Examination finds the 1st PIP, 1st MCP, 2nd PIP, 2nd MCP, 3rd PIP, 3rd MCP, 4th PIP, 4th MCP, 5th PIP and 5th MCP normal.         No data to display     Assessment:         Medication List with Changes/Refills   Current Medications    BENAZEPRIL (LOTENSIN) 40 MG TABLET    Take 40 mg by mouth 2 (two) times a day.       Start Date: 11/8/2021 End Date: --    CHOLESTYRAMINE-ASPARTAME (QUESTRAN LIGHT) 4 GRAM PWPK    Take by mouth.       Start Date: 2/14/2024 End Date: --    CLORAZEPATE (TRANXENE) 7.5 MG TAB    Take 7.5 mg by mouth 3 (three) times daily as needed.       Start Date: 8/8/2022  End Date: --    FAMOTIDINE (PEPCID) 40 MG TABLET    Take 40 mg by mouth every morning.       Start Date: 8/8/2022  End Date: --    FLUTICASONE PROPIONATE (FLONASE) 50 MCG/ACTUATION NASAL SPRAY    2 sprays by Each Nostril route as needed.       Start Date: 3/7/2022  End Date: --    LORATADINE (CLARITIN) 10 MG TABLET    Take 1 tablet by mouth every morning.       Start Date: 12/1/2024 End Date: 12/1/2025    LOVASTATIN (MEVACOR) 40 MG TABLET    Take 40 mg by mouth nightly as needed.       Start Date: 7/3/2022  End Date: --    ONDANSETRON (ZOFRAN) 4 MG TABLET    Take 4 mg by mouth every 8 (eight) hours as needed.       Start Date: --        End Date: --    PREDNISONE (DELTASONE) 5 MG TABLET    Take 5 mg by mouth as needed.       Start Date: --        End Date: --    TIZANIDINE 2 MG CAP    Take 1 capsule by mouth every evening.       Start Date: --        End Date: --   Changed and/or Refilled Medications    Modified Medication Previous Medication    LEFLUNOMIDE (ARAVA) 20 MG TAB leflunomide (ARAVA) 20 MG Tab       Take 1 tablet (20 mg  total) by mouth once daily.    TAKE 1 TABLET BY MOUTH EVERY DAY WITH DINNER OR EVENING MEAL       Start Date: 1/9/2025  End Date: --    Start Date: 8/23/2024 End Date: 1/9/2025   Discontinued Medications    DICLOFENAC SODIUM (VOLTAREN) 1 % GEL    Apply 2 g topically 4 (four) times daily.       Start Date: 7/9/2024  End Date: 1/9/2025    PREDNISONE (DELTASONE) 50 MG TAB    Take 50 mg by mouth.       Start Date: 6/18/2024 End Date: 1/9/2025         ICD-10-CM ICD-9-CM   1. Psoriatic arthritis  L40.50 696.0   2. Psoriasis  L40.9 696.1   3. Osteoarthritis of both hands, unspecified osteoarthritis type  M19.041 715.94    M19.042    4. Drug-induced immunodeficiency  D84.821 279.3    Z79.899 E947.9   5. Fibromyalgia  M79.7 729.1           Plan:       1. Psoriatic arthritis  Assessment & Plan:   Minimal morning stiffness. No synovitis on exam. She has increased pain in her left wrist with swelling. She previously received injection for tenosynovitis with some improvement. May need to consider change or upgrade in treatment if continues with the left wrist. Reports she has not used prednisone in almost 1 year for flare up related to PsA. She reports the thumb splint is helping the thumb but aggravating the wrist. Plans to try different splint/ace bandage.    Prednisone 5 mg PRN for flare up- she has not used this in almost 1 year. She will do a short course of Low dose prednisone for flare up. May need to consider other treatment options if persists although she feels like Leflunomide has significantly helped her condition. She plans to f/u with Orthopedic PRN for injection    Continue leflunomide 20 mg daily     Labs ordered today for continued monitoring- she plans to complete these labs at her PCP's office in the near future. Paper orders given      Orders:  -     leflunomide (ARAVA) 20 MG Tab; Take 1 tablet (20 mg total) by mouth once daily.  Dispense: 90 tablet; Refill: 1  -     Sedimentation rate; Future; Expected date:  01/09/2025  -     C-Reactive Protein; Future; Expected date: 01/09/2025  -     Comprehensive Metabolic Panel; Future; Expected date: 01/09/2025  -     CBC Auto Differential; Future; Expected date: 01/09/2025    2. Psoriasis  Assessment & Plan:  Currently stable- no recent outbreak and no new patches  Past psoriasis on  bilateral elbows and knees  She is established care with [facility].           3. Osteoarthritis of both hands, unspecified osteoarthritis type  Assessment & Plan:  6/29/22-Xray of bilateral hands: There is severe degenerative arthrosis at the left index and long finger DIP joints as well as at the little finger DIP and PIP joints. There is mild degenerative arthrosis at the remaining IP and MCP joints on the left. There is severe degenerative arthrosis at the right little finger IP joints with mild degenerative arthrosis at the remaining right IP and MCP joints. Mild soft tissue swelling is present at the severely degenerated joints. There are no findings of inflammatory arthritis and no chondrocalcinosis. Impression: Degenerative arthrosis at both hands as detailed above with no acute pathology identified.     She is having increased joint pain after activity and during the day while using them. Reviewed above xrays findings with the patient. Discussed topical Voltaren Gel (rx sent to pharmacy), warm water, compression/arthritis gloves.      4. Drug-induced immunodeficiency  Assessment & Plan:  Compromised immune system s/t autoimmune disease and use of immunosuppressive medications.   - Advised strict adherence to age appropriate vaccinations and cancer screenings- including yearly skin exams  - Advised to get immediate medical care if any infection.   - hold Leflunomide with infections      Orders:  -     Comprehensive Metabolic Panel; Future; Expected date: 01/09/2025  -     CBC Auto Differential; Future; Expected date: 01/09/2025    5. Fibromyalgia  Assessment & Plan:  Stable  She is  taking Tizanidine     Fibromyalgia is a chronic pain syndrome.  Underlying causes of fibromyalgia may be numerous.  An underlying nonrestorative sleep pattern, mental and physical stressors play a role in pain perception.  It is important to reduce stress levels and improving sleep patterns/hygiene.   Exercise and a healthy life-style is important in management of this syndrome           35 minutes of total time spent on the encounter, which includes face to face time and non-face to face time preparing to see the patient (eg, review of tests), Obtaining and/or reviewing separately obtained history, Documenting clinical information in the electronic or other health record, Independently interpreting results (not separately reported) and communicating results to the patient/family/caregiver, or Care coordination (not separately reported).

## 2025-01-09 NOTE — ASSESSMENT & PLAN NOTE
Currently stable- no recent outbreak and no new patches  Past psoriasis on  bilateral elbows and knees  She is established care with [facility].

## 2025-05-29 ENCOUNTER — TELEPHONE (OUTPATIENT)
Dept: RHEUMATOLOGY | Facility: CLINIC | Age: 70
End: 2025-05-29
Payer: MEDICARE

## 2025-05-29 NOTE — TELEPHONE ENCOUNTER
Thank you for the update. There is no need to change anything from a Rheumatological standpoint. She can continue the Leflunomide throughout the procedure. Thanks

## 2025-07-09 ENCOUNTER — OFFICE VISIT (OUTPATIENT)
Dept: RHEUMATOLOGY | Facility: CLINIC | Age: 70
End: 2025-07-09
Payer: MEDICARE

## 2025-07-09 VITALS
TEMPERATURE: 99 F | OXYGEN SATURATION: 96 % | BODY MASS INDEX: 26.9 KG/M2 | WEIGHT: 146.19 LBS | SYSTOLIC BLOOD PRESSURE: 158 MMHG | RESPIRATION RATE: 20 BRPM | HEIGHT: 62 IN | HEART RATE: 83 BPM | DIASTOLIC BLOOD PRESSURE: 88 MMHG

## 2025-07-09 DIAGNOSIS — L40.50 PSORIATIC ARTHRITIS: ICD-10-CM

## 2025-07-09 DIAGNOSIS — L40.9 PSORIASIS: ICD-10-CM

## 2025-07-09 DIAGNOSIS — M19.042 OSTEOARTHRITIS OF BOTH HANDS, UNSPECIFIED OSTEOARTHRITIS TYPE: Primary | ICD-10-CM

## 2025-07-09 DIAGNOSIS — M19.041 OSTEOARTHRITIS OF BOTH HANDS, UNSPECIFIED OSTEOARTHRITIS TYPE: Primary | ICD-10-CM

## 2025-07-09 DIAGNOSIS — M79.7 FIBROMYALGIA: ICD-10-CM

## 2025-07-09 DIAGNOSIS — Z79.899 DRUG-INDUCED IMMUNODEFICIENCY: ICD-10-CM

## 2025-07-09 DIAGNOSIS — D84.821 DRUG-INDUCED IMMUNODEFICIENCY: ICD-10-CM

## 2025-07-09 PROCEDURE — 4010F ACE/ARB THERAPY RXD/TAKEN: CPT | Mod: CPTII,S$GLB,,

## 2025-07-09 PROCEDURE — 3288F FALL RISK ASSESSMENT DOCD: CPT | Mod: CPTII,S$GLB,,

## 2025-07-09 PROCEDURE — 1126F AMNT PAIN NOTED NONE PRSNT: CPT | Mod: CPTII,S$GLB,,

## 2025-07-09 PROCEDURE — 1101F PT FALLS ASSESS-DOCD LE1/YR: CPT | Mod: CPTII,S$GLB,,

## 2025-07-09 PROCEDURE — 99999 PR PBB SHADOW E&M-EST. PATIENT-LVL IV: CPT | Mod: PBBFAC,,,

## 2025-07-09 PROCEDURE — 3008F BODY MASS INDEX DOCD: CPT | Mod: CPTII,S$GLB,,

## 2025-07-09 PROCEDURE — 1159F MED LIST DOCD IN RCRD: CPT | Mod: CPTII,S$GLB,,

## 2025-07-09 PROCEDURE — 99214 OFFICE O/P EST MOD 30 MIN: CPT | Mod: S$GLB,,,

## 2025-07-09 PROCEDURE — 3077F SYST BP >= 140 MM HG: CPT | Mod: CPTII,S$GLB,,

## 2025-07-09 PROCEDURE — 3079F DIAST BP 80-89 MM HG: CPT | Mod: CPTII,S$GLB,,

## 2025-07-09 RX ORDER — LEFLUNOMIDE 20 MG/1
20 TABLET ORAL DAILY
Qty: 90 TABLET | Refills: 1 | Status: SHIPPED | OUTPATIENT
Start: 2025-07-09

## 2025-07-09 NOTE — ASSESSMENT & PLAN NOTE
Diagnosed by previous Rheumatologist- Dr Padilla. Started Leflunomide in August 2022. Reports significant improvement after initiation of Leflunomide. Minimal morning stiffness. No synovitis on exam. She has increased pain in her left wrist with swelling. She previously received injection for tenosynovitis with some improvement. Doing well today. She has not needed the PRN prednisone.    Continue Leflunomide 20 mg daily     Labs completed May 2025. She plans to complete labs with PCP in the future. DOROTEO signed for cardiologist and PCP to request future labs.

## 2025-07-09 NOTE — ASSESSMENT & PLAN NOTE
Compromised immune system s/t autoimmune disease and use of immunosuppressive medications.   - Advised strict adherence to age appropriate vaccinations and cancer screenings- including yearly skin exams  - Advised to get immediate medical care if any infection.   - hold Leflunomide with infections  - hepatitis and TB labs with next office visit

## 2025-07-09 NOTE — PROGRESS NOTES
"Subjective:           Patient ID: Cindy Goncalves is a 69 y.o. female.    Chief Complaint: Follow-up      Ms. Goncalves is a 68-year-old here for a follow-up.  She initiated care with Dr. Padilla on 8/16/22 and was initiated on leflunomide. The patient had psoriasis on her elbows posterior aspect in the past but not currently  and she has sausage toes bilaterally. At her last visit she reported she is doing well and this is the "best she's felt in years" She is able to garden and be active with little pain. At prior visits she was referred to Dr. Leal office and more recently referred to [facility] for skin spot that was dark with atypical borders and she does have hx of atypical mole.    Followed by Dr. Spencer GI surgeon for small bowel obstruction  Followed by GI- Sarah  Followed by Cardiology- Dr. Mcmillan    Hx of uterine cancer:  5 years ago- hysterectomy- then came back and completed radiation 3 years ago  Hx of Back surgery about 2001    Hx of infection resulting from dental cleaning and dental fillings- dentist uses pyophylactic antibiotics before cleaning    Denies history of fevers, rashes, photosensitivity, oral or nasal ulcers, h/o MI, stroke, seizures, h/o PE or DVT, , uveitis.      Family history of autoimmune disease: None  Pregnancies: 1 Miscarriages: 0  Smoking: Previous Smoker Quit in 2005 January 9, 2024: Recently hospitalized with small bowel construction. 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 Most recent hospitalization was last week. She does have appointments for more testing with GI surgeon and gastro to evaluate the need for a possibility of future intestinal surgery. She also has fei appointment in the near future with a cardiologist for an abnormal EKG and cardiac clearance. Since her last visit, she was evaluated by [facility] for atypical mole and was told it was a sun spot. Prescribed ointment and " leave in liquid for scalp for psoriasis. She does endorse antibiotics for enlarged lymph node which has now resolved. She was also prescribed antibiotics for upper respiratory infection that she finished 2 weeks ago. Reports her joint pain/stiffness has remained minimal and the Leflunomide continues to work well for her. Her blood pressure is elevated today but reports her numbers are actually much better and she is following closely by PCP    Today July 2024: Continues on Leflunomide 20 mg nightly. Reports she is having increased joint pain, including pain in the right knee and lower back. Reports pain in her hands have increased and the increased pain is worse during the day and after using her hands. She is having increased skin cracking of the hands occasionally causing bleeding. She is using topical triamincolone and neosporin with improvement.  No new patches of psoriasis and no active psoriasis today. She has not had surgery for bowel obstruction and is following and completing imaging for Gastro related to this issue. Denies any recent infections.    Today January 2025: Continues on Leflunomide 20 mg nightly. Reports she is having increased overall pain in her knees, hands, hips with the recent weather changes. Does report increased pain with increase activity Since her last visit she was evaluated by Orthopedic and given an injection for De Quervain's Tenosynovitis with improvement. She is having swelling of her left wrist today with some pain near base of thumb. Reports the thumb splint does help with the thumb but is aggravating her wrist, she plans to try a different splint/ace bandage. Eports feeling the beginning of trigger finger in her 5th finger but doesn't get stuck. Denies numbness/tingling. Minimal morning stiffness. No synovitis on physical exam today. Reports she still has the prescriptions of prednisone 5 mg from one year ago, she has not taken it in almost 1 year for flare up. Reports  "improvement in skin cracking- she is now using new ointment (fishermans friend?) Does have one small psoriasis patch under left eye. Reports this happens once every 1-2 months. Reports psoriasis and joint pain have both significantly improved after starting the Leflunomide. Does endorse recent infection (sinusitis) requiring abx.    Today July 2025: Continues on Leflunomide 20 mg nightly.  Reports doing much better compared to prior office visit. No active psoriasis, No enthesitis.Minimal stiffness, some pain after using hands frequently or repetitive movements. She did have ER visit and workup for hematuria and was told she has radiation cystitis. CBC and CMP completed May 2025 and reviewed with the patient today. Denies any recent infection. Doing well today         Review of Systems   Constitutional:  Negative for appetite change, chills and fever.   HENT:  Negative for congestion, ear pain, mouth sores, nosebleeds and trouble swallowing.    Eyes:  Negative for photophobia and discharge.   Respiratory:  Negative for chest tightness and shortness of breath.    Cardiovascular:  Negative for chest pain.   Gastrointestinal:  Negative for abdominal pain and vomiting.   Endocrine: Negative.    Genitourinary:  Negative for hematuria.   Musculoskeletal:  Positive for arthralgias.        As per HPI     Skin:         Psoriasis patch under left eye- where eyeglass rests on face. Comes and goes   Neurological:  Negative for weakness.         Objective:   BP (!) 158/88 (BP Location: Right arm, Patient Position: Sitting)   Pulse 83   Temp 98.8 °F (37.1 °C) (Oral)   Resp 20   Ht 5' 2" (1.575 m)   Wt 66.3 kg (146 lb 3.2 oz)   SpO2 96%   BMI 26.74 kg/m²          Physical Exam   Constitutional: She is oriented to person, place, and time. She appears well-developed and well-nourished. No distress.   HENT:   Head: Normocephalic and atraumatic.   Right Ear: External ear normal.   Left Ear: External ear normal.   Eyes: Pupils " are equal, round, and reactive to light.   Cardiovascular: Normal rate.   Murmur heard.  Pulmonary/Chest: Effort normal.   Musculoskeletal:      Cervical back: Neck supple.      Comments: Sausage toes   Neurological: She is alert and oriented to person, place, and time. She displays normal reflexes. No cranial nerve deficit or sensory deficit. She exhibits normal muscle tone. Coordination normal.   Skin: No rash noted. No erythema.   Vitals reviewed.      Right Side Rheumatological Exam     Examination finds the 1st PIP, 1st MCP, 2nd PIP, 2nd MCP, 3rd PIP, 3rd MCP, 4th PIP, 4th MCP, 5th PIP and 5th MCP normal.    Left Side Rheumatological Exam     Examination finds the 1st PIP, 1st MCP, 2nd PIP, 2nd MCP, 3rd PIP, 3rd MCP, 4th PIP, 4th MCP, 5th PIP and 5th MCP normal.         No data to display     Assessment:         Medication List with Changes/Refills   Current Medications    BENAZEPRIL (LOTENSIN) 40 MG TABLET    Take 40 mg by mouth 2 (two) times a day.       Start Date: 11/8/2021 End Date: --    CLORAZEPATE (TRANXENE) 7.5 MG TAB    Take 7.5 mg by mouth 3 (three) times daily as needed.       Start Date: 8/8/2022  End Date: --    FAMOTIDINE (PEPCID) 40 MG TABLET    Take 40 mg by mouth every morning.       Start Date: 8/8/2022  End Date: --    FLUTICASONE PROPIONATE (FLONASE) 50 MCG/ACTUATION NASAL SPRAY    2 sprays by Each Nostril route as needed.       Start Date: 3/7/2022  End Date: --    LOVASTATIN (MEVACOR) 40 MG TABLET    Take 40 mg by mouth nightly.       Start Date: 7/3/2022  End Date: --    ONDANSETRON (ZOFRAN) 4 MG TABLET    Take 4 mg by mouth every 8 (eight) hours as needed.       Start Date: --        End Date: --    PREDNISONE (DELTASONE) 5 MG TABLET    Take 5 mg by mouth as needed.       Start Date: --        End Date: --    TIZANIDINE 2 MG CAP    Take 1 capsule by mouth every evening.       Start Date: --        End Date: --   Changed and/or Refilled Medications    Modified Medication Previous  Medication    LEFLUNOMIDE (ARAVA) 20 MG TAB leflunomide (ARAVA) 20 MG Tab       Take 1 tablet (20 mg total) by mouth once daily.    Take 1 tablet (20 mg total) by mouth once daily.       Start Date: 7/9/2025  End Date: --    Start Date: 1/9/2025  End Date: 7/9/2025   Discontinued Medications    CHOLESTYRAMINE-ASPARTAME (QUESTRAN LIGHT) 4 GRAM PWPK    Take by mouth.       Start Date: 2/14/2024 End Date: 7/9/2025    LORATADINE (CLARITIN) 10 MG TABLET    Take 1 tablet by mouth every morning.       Start Date: 12/1/2024 End Date: 7/9/2025         ICD-10-CM ICD-9-CM   1. Osteoarthritis of both hands, unspecified osteoarthritis type  M19.041 715.94    M19.042    2. Psoriatic arthritis  L40.50 696.0   3. Drug-induced immunodeficiency  D84.821 279.3    Z79.899 E947.9   4. Psoriasis  L40.9 696.1   5. Fibromyalgia  M79.7 729.1           Plan:       1. Osteoarthritis of both hands, unspecified osteoarthritis type  Assessment & Plan:  6/29/22-Xray of bilateral hands: There is severe degenerative arthrosis at the left index and long finger DIP joints as well as at the little finger DIP and PIP joints. There is mild degenerative arthrosis at the remaining IP and MCP joints on the left. There is severe degenerative arthrosis at the right little finger IP joints with mild degenerative arthrosis at the remaining right IP and MCP joints. Mild soft tissue swelling is present at the severely degenerated joints. There are no findings of inflammatory arthritis and no chondrocalcinosis. Impression: Degenerative arthrosis at both hands as detailed above with no acute pathology identified.     Reviewed above xrays findings with the patient. Previously discussed topical Voltaren Gel,warm water, compression/arthritis gloves.      2. Psoriatic arthritis  Assessment & Plan:  Diagnosed by previous Rheumatologist- Dr Padilla. Started Leflunomide in August 2022. Reports significant improvement after initiation of Leflunomide. Minimal morning  stiffness. No synovitis on exam. She has increased pain in her left wrist with swelling. She previously received injection for tenosynovitis with some improvement. Doing well today. She has not needed the PRN prednisone.    Continue Leflunomide 20 mg daily     Labs completed May 2025. She plans to complete labs with PCP in the future. DOROTEO signed for cardiologist and PCP to request future labs.      Orders:  -     leflunomide (ARAVA) 20 MG Tab; Take 1 tablet (20 mg total) by mouth once daily.  Dispense: 90 tablet; Refill: 1    3. Drug-induced immunodeficiency  Assessment & Plan:  Compromised immune system s/t autoimmune disease and use of immunosuppressive medications.   - Advised strict adherence to age appropriate vaccinations and cancer screenings- including yearly skin exams  - Advised to get immediate medical care if any infection.   - hold Leflunomide with infections  - hepatitis and TB labs with next office visit        4. Psoriasis  Assessment & Plan:  Currently stable- no recent outbreak and no new patches  Past psoriasis on  bilateral elbows and knees  She is established care with [facility].           5. Fibromyalgia  Assessment & Plan:  Stable  She is taking Tizanidine     Fibromyalgia is a chronic pain syndrome.  Underlying causes of fibromyalgia may be numerous.  An underlying nonrestorative sleep pattern, mental and physical stressors play a role in pain perception.  It is important to reduce stress levels and improving sleep patterns/hygiene.   Exercise and a healthy life-style is important in management of this syndrome             33 minutes of total time spent on the encounter, which includes face to face time and non-face to face time preparing to see the patient (eg, review of tests), Obtaining and/or reviewing separately obtained history, Documenting clinical information in the electronic or other health record, Independently interpreting results (not separately reported) and  communicating results to the patient/family/caregiver, or Care coordination (not separately reported).

## 2025-07-09 NOTE — ASSESSMENT & PLAN NOTE
6/29/22-Xray of bilateral hands: There is severe degenerative arthrosis at the left index and long finger DIP joints as well as at the little finger DIP and PIP joints. There is mild degenerative arthrosis at the remaining IP and MCP joints on the left. There is severe degenerative arthrosis at the right little finger IP joints with mild degenerative arthrosis at the remaining right IP and MCP joints. Mild soft tissue swelling is present at the severely degenerated joints. There are no findings of inflammatory arthritis and no chondrocalcinosis. Impression: Degenerative arthrosis at both hands as detailed above with no acute pathology identified.     Reviewed above xrays findings with the patient. Previously discussed topical Voltaren Gel,warm water, compression/arthritis gloves.

## 2025-07-10 NOTE — ASSESSMENT & PLAN NOTE
Currently stable- no recent outbreak and no new patches  Past psoriasis on  bilateral elbows and knees  She is established care with [facility].